# Patient Record
Sex: MALE | Race: WHITE | Employment: UNEMPLOYED | URBAN - METROPOLITAN AREA
[De-identification: names, ages, dates, MRNs, and addresses within clinical notes are randomized per-mention and may not be internally consistent; named-entity substitution may affect disease eponyms.]

---

## 2017-02-24 DIAGNOSIS — R46.89 AGGRESSIVE BEHAVIOR OF ADULT: ICD-10-CM

## 2017-02-24 DIAGNOSIS — R45.1 AGITATION: ICD-10-CM

## 2017-02-24 NOTE — TELEPHONE ENCOUNTER
Olanzapine 10mg  Last Written Prescription Date: 11/17/16  Last Fill Quantity: 90, # refills: 0  [place] [facility] [place] or [facility] prescribing provider: 8/26/16       BP Readings from Last 3 Encounters:   08/26/16 130/86   08/20/16 137/77   08/16/16 (!) 126/98     Pulse Readings from Last 2 Encounters:   08/26/16 104   08/20/16 89     Lab Results   Component Value Date     08/20/2016     Lab Results   Component Value Date    WBC 6.7 08/20/2016     Lab Results   Component Value Date    RBC 5.06 08/20/2016     Lab Results   Component Value Date    HGB 15.7 08/20/2016     Lab Results   Component Value Date    HCT 45.2 08/20/2016     No components found for: MCT  Lab Results   Component Value Date    MCV 89 08/20/2016     Lab Results   Component Value Date    MCH 31.0 08/20/2016     Lab Results   Component Value Date    MCHC 34.7 08/20/2016     Lab Results   Component Value Date    RDW 11.9 08/20/2016     Lab Results   Component Value Date     08/20/2016     Lab Results   Component Value Date    CHOL 190 08/16/2016     Lab Results   Component Value Date    HDL 41 08/16/2016     Lab Results   Component Value Date    LDL 94 08/16/2016     Lab Results   Component Value Date    TRIG 277 08/16/2016     Lab Results   Component Value Date    CHOLHDLRATIO 4.2 02/28/2015       Labs showing if normal/abnormal  Lab Results   Component Value Date     (H) 08/20/2016     Lab Results   Component Value Date    CHOL 190 08/16/2016    TRIG 277 (H) 08/16/2016    HDL 41 08/16/2016    LDL 94 08/16/2016    VLDL 33 (H) 02/28/2015    CHOLHDLRATIO 4.2 02/28/2015

## 2017-03-01 RX ORDER — OLANZAPINE 5 MG/1
TABLET ORAL
Qty: 60 TABLET | Refills: 5 | Status: SHIPPED | OUTPATIENT
Start: 2017-03-01 | End: 2017-07-26

## 2017-03-01 NOTE — TELEPHONE ENCOUNTER
Routing refill request to provider for review/approval because:  Labs out of range:  Trig, glucose  BP out of SO parameters  HR >100    Please advise, thanks.

## 2017-04-01 ENCOUNTER — HOSPITAL ENCOUNTER (EMERGENCY)
Facility: CLINIC | Age: 22
Discharge: HOME | End: 2017-04-01
Attending: EMERGENCY MEDICINE | Admitting: EMERGENCY MEDICINE
Payer: COMMERCIAL

## 2017-04-01 VITALS
HEART RATE: 79 BPM | TEMPERATURE: 97.8 F | SYSTOLIC BLOOD PRESSURE: 142 MMHG | OXYGEN SATURATION: 98 % | DIASTOLIC BLOOD PRESSURE: 90 MMHG | RESPIRATION RATE: 16 BRPM

## 2017-04-01 DIAGNOSIS — H60.501 ACUTE OTITIS EXTERNA OF RIGHT EAR, UNSPECIFIED TYPE: ICD-10-CM

## 2017-04-01 PROCEDURE — 99282 EMERGENCY DEPT VISIT SF MDM: CPT

## 2017-04-01 RX ORDER — CIPROFLOXACIN AND DEXAMETHASONE 3; 1 MG/ML; MG/ML
4 SUSPENSION/ DROPS AURICULAR (OTIC) 2 TIMES DAILY
Qty: 7.5 ML | Refills: 0 | Status: SHIPPED | OUTPATIENT
Start: 2017-04-01 | End: 2017-04-08

## 2017-04-01 ASSESSMENT — ENCOUNTER SYMPTOMS
WOUND: 1
RHINORRHEA: 0
COUGH: 0

## 2017-04-01 NOTE — ED PROVIDER NOTES
History     Chief Complaint:  Otalgia      HPI   Farooq Sorto is a 21 year old male with a history of ADHD, Anxiety, Chemical dependency, depression, and seizures who presents with right sided otalgia. The patient states that he developed right sided ear pain for the last 2-3 days. He did notice some bloody drainage from the right ear. Additionally, he is concerned about an insect bight to his right posterior calf. He states that it is healing but continues to be slightly red. The patient denies any cough, runny nose, sore throat, or other symptoms.     Allergies:  Penicillins-Rash     Medications:    Zyprexa  Zantac  Albuterol Inhaler  Clonazepam  Vimpat  Guanfacine  Lamictal   Citalopram Hydrobromide  Amantadine     Past Medical History:    ADHD  Anxiety   Chemical dependency   Depressive Disorder  Seizures    Past Surgical History:    Back Surgery   Head and neck surgery   Soft Tissue Surgery     Family History:    Diabetes-Father  Seizure Disorder-Mother, Brother    Social History:  Marital Status: Single  Presents to the ED with his father  Tobacco Use: Former Smoker, quit 2016  Alcohol Use: No  PCP: Daina Waldron      Review of Systems   HENT: Positive for ear discharge and ear pain. Negative for rhinorrhea.    Respiratory: Negative for cough.    Skin: Positive for wound.   All other systems reviewed and are negative.    Physical Exam   First Vitals:  BP: (!) 149/95  Pulse: 79  Temp: 97.8  F (36.6  C)  Resp: 16  SpO2: 97 %    Physical Exam  Constitutional: Patient is well appearing. No distress.  Head: Atraumatic.  Ears: TM on right intact. Dried blood in the canal, skin breakdown without pinna tenderness. No mastoid tenderness no adenopathy.   Mouth/Throat: Oropharynx is clear and moist. No oropharyngeal exudate.  Eyes: Conjunctivae and EOM are normal. No scleral icterus.  Neck: Normal range of motion. Neck supple.   Cardiovascular: Normal rate, regular rhythm, normal heart sounds and intact  distal pulses.   Pulmonary/Chest: Breath sounds normal. No respiratory distress.  Abdominal: Soft. Bowel sounds are normal. No distension. No tenderness. No rebound or guarding.   Musculoskeletal: Normal range of motion. No edema or tenderness.   Neurological: Alert and orientated to person, place, and time. No observable focal neuro deficit  Skin: Warm and dry. No rash noted. Not diaphoretic.      Emergency Department Course   Emergency Department Course:  Nursing notes and vitals reviewed.  I performed an exam of the patient as documented above.  Findings and plan explained to the patient. Patient discharged home with instructions regarding supportive care, medications, and reasons to return. The importance of close follow-up was reviewed. The patient was prescribed Ciprodex.      Impression & Plan    Medical Decision Making:  Not malignant otitis.  Appears cerumen with skin breakdown in the canal.  Drops empirically and follow up recommended.       All questions and concerns were answered. The patient was discharged home and recommended to follow up with his primary physician and return with any new or worsening symptoms.      Diagnosis:    ICD-10-CM    1. Acute otitis externa of right ear, unspecified type H60.501        Disposition:  discharged to home    Discharge Medications:  Discharge Medication List as of 4/1/2017  8:22 AM      START taking these medications    Details   ciprofloxacin-dexamethasone (CIPRODEX) otic suspension Place 4 drops into the right ear 2 times daily for 7 days, Disp-7.5 mL, R-0, Local Print               Henrietta ESCALANTE am serving as a scribe on 4/1/2017 at 8:06 AM to personally document services performed by  based on my observations and the provider's statements to me.    4/1/2017   [facility] EMERGENCY DEPARTMENT       Sung Palma MD  04/01/17 [id number]

## 2017-04-01 NOTE — DISCHARGE INSTRUCTIONS
External Ear Infection (Adult)    External otitis (also called  swimmer s ear ) is an infection in the ear canal. It is often caused by bacteria or fungus. It can occur a few days after water gets trapped in the ear canal (from swimming or bathing). It can also occur after cleaning too deeply in the ear canal with a cotton swab or other object. Sometimes, hair care products get into the ear canal and cause this problem.  Symptoms can include pain, fever, itching, redness, drainage, or swelling of the ear canal. Temporary hearing loss may also occur.  Home care    Do not try to clean the ear canal. This can push pus and bacteria deeper into the canal.    Use prescribed ear drops as directed. These help reduce swelling and fight the infection. If an ear wick was placed in the ear canal, apply drops right onto the end of the wick. The wick will draw the medication into the ear canal even if it is swollen closed.    A cotton ball may be loosely placed in the outer ear to absorb any drainage.    You may use acetaminophen or ibuprofen to control pain, unless another medication was prescribed. Note: If you have chronic liver or kidney disease or ever had a stomach ulcer or GI bleeding, talk to your health care provider before taking any of these medications.    Do not allow water to get into your ear when bathing. Also, avoid swimming until the infection has cleared.  Prevention    Keep your ears dry. This helps lower the risk of infection. Dry your ears with a towel or hair dryer after getting wet. Also, use ear plugs when swimming.    Do not stick any objects in the ear to remove wax.    If you feel water trapped in your ear, use ear drops right away. You can get these drops over the counter at most drugstores.  They work by removing water from the ear canal.  Follow-up care  Follow up with your health care provider in one week, or as advised.   When to seek medical advice  Call your health care provider right away if  any of these occur:    Ear pain becomes worse or doesn t improve after 3 days of treatment    Redness or swelling of the outer ear occurs or gets worse    Headache    Painful or stiff neck    Drowsiness or confusion    Fever of 100.4 F (38 C) or higher, or as directed by your health care provider    Seizure    5720-3912 [employer]. [address] [place] [address]. All rights reserved. This information is not intended as a substitute for professional medical care. Always follow your healthcare professional's instructions.

## 2017-04-01 NOTE — ED NOTES
Patient arrives here for evaluation or right ear pain that started a few days ago with associated symptoms of bleeding. Also concerned about insect bite of the right leg. Patient is AOX4, ABC's intact.

## 2017-04-01 NOTE — ED AVS SNAPSHOT
[facility] Emergency Department    [address] [facility] [address]    Phone:  [phone]    Fax:  [phone]                                       Farooq Sorto   MRN: [id number]    Department:  [facility] Emergency Department   Date of Visit:  4/1/2017           Patient Information     Date Of Birth          1995        Your diagnoses for this visit were:     Acute otitis externa of right ear, unspecified type        You were seen by Sung Palma MD.      Follow-up Information     Follow up with Daina Waldron MD. Schedule an appointment as soon as possible for a visit in 2 days.    Specialty:  Internal Medicine    Why:  As needed, If symptoms worsen    Contact information:    [facility]  [address] [facility] [address]  [phone]          Discharge Instructions         External Ear Infection (Adult)    External otitis (also called  swimmer s ear ) is an infection in the ear canal. It is often caused by bacteria or fungus. It can occur a few days after water gets trapped in the ear canal (from swimming or bathing). It can also occur after cleaning too deeply in the ear canal with a cotton swab or other object. Sometimes, hair care products get into the ear canal and cause this problem.  Symptoms can include pain, fever, itching, redness, drainage, or swelling of the ear canal. Temporary hearing loss may also occur.  Home care    Do not try to clean the ear canal. This can push pus and bacteria deeper into the canal.    Use prescribed ear drops as directed. These help reduce swelling and fight the infection. If an ear wick was placed in the ear canal, apply drops right onto the end of the wick. The wick will draw the medication into the ear canal even if it is swollen closed.    A cotton ball may be loosely placed in the outer ear to absorb any drainage.    You may use acetaminophen or ibuprofen to control pain, unless  another medication was prescribed. Note: If you have chronic liver or kidney disease or ever had a stomach ulcer or GI bleeding, talk to your health care provider before taking any of these medications.    Do not allow water to get into your ear when bathing. Also, avoid swimming until the infection has cleared.  Prevention    Keep your ears dry. This helps lower the risk of infection. Dry your ears with a towel or hair dryer after getting wet. Also, use ear plugs when swimming.    Do not stick any objects in the ear to remove wax.    If you feel water trapped in your ear, use ear drops right away. You can get these drops over the counter at most drugstores.  They work by removing water from the ear canal.  Follow-up care  Follow up with your health care provider in one week, or as advised.   When to seek medical advice  Call your health care provider right away if any of these occur:    Ear pain becomes worse or doesn t improve after 3 days of treatment    Redness or swelling of the outer ear occurs or gets worse    Headache    Painful or stiff neck    Drowsiness or confusion    Fever of 100.4 F (38 C) or higher, or as directed by your health care provider    Seizure    2177-0444 [employer]. [address] [place] [address]. All rights reserved. This information is not intended as a substitute for professional medical care. Always follow your healthcare professional's instructions.          24 Hour Appointment Hotline       To make an appointment at any [facility], call [phone] ([phone]). If you don't have a family doctor or clinic, we will help you find one. [facility] clinics are conveniently located to serve the needs of you and your family.             Review of your medicines      START taking        Dose / Directions Last dose taken    ciprofloxacin-dexamethasone otic suspension   Commonly known as:  CIPRODEX   Dose:  4 drop   Quantity:  7.5 mL        Place 4 drops  into the right ear 2 times daily for 7 days   Refills:  0          Our records show that you are taking the medicines listed below. If these are incorrect, please call your family doctor or clinic.        Dose / Directions Last dose taken    albuterol 108 (90 BASE) MCG/ACT Inhaler   Commonly known as:  PROAIR HFA/PROVENTIL HFA/VENTOLIN HFA   Dose:  2 puff   Quantity:  1 Inhaler        Inhale 2 puffs into the lungs every 6 hours as needed for shortness of breath / dyspnea or wheezing   Refills:  1        amantadine 100 MG capsule   Commonly known as:  SYMMETREL   Dose:  100 mg        Take 100 mg by mouth 2 times daily   Refills:  0        benzoyl peroxide 10 % topical gel        Apply topically daily   Refills:  0        CITALOPRAM HYDROBROMIDE PO   Dose:  20 mg        Take 20 mg by mouth daily   Refills:  0        CLONAZEPAM PO   Dose:  0.5 mg        Take 0.5 mg by mouth   Refills:  0        GUANFACINE HCL PO   Dose:  2 mg        Take 2 mg by mouth 2 times daily   Refills:  0        lamoTRIgine 200 MG tablet   Commonly known as:  LaMICtal   Dose:  500 mg        Take 500 mg by mouth 2 times daily   Refills:  0        * OLANZapine 10 MG tablet   Commonly known as:  zyPREXA   Dose:  10 mg   Quantity:  90 tablet        Take 1 tablet (10 mg) by mouth At Bedtime   Refills:  0        * OLANZapine 5 MG tablet   Commonly known as:  zyPREXA   Quantity:  60 tablet        Take 1 tablet by mouth twice daily as needed for agitation.   Refills:  5        ranitidine 150 MG tablet   Commonly known as:  ZANTAC   Dose:  150 mg   Quantity:  60 tablet        Take 1 tablet (150 mg) by mouth 2 times daily   Refills:  9        VIMPAT PO   Dose:  100 mg        Take 100 mg by mouth   Refills:  0        * Notice:  This list has 2 medication(s) that are the same as other medications prescribed for you. Read the directions carefully, and ask your doctor or other care provider to review them with you.            Prescriptions were sent or  printed at these locations (1 Prescription)                   Other Prescriptions                Printed at Department/Unit printer (1 of 1)         ciprofloxacin-dexamethasone (CIPRODEX) otic suspension                Orders Needing Specimen Collection     None      Pending Results     No orders found from 3/30/2017 to 4/2/2017.            Pending Culture Results     No orders found from 3/30/2017 to 4/2/2017.             Test Results from your hospital stay            Clinical Quality Measure: Blood Pressure Screening     Your blood pressure was checked while you were in the emergency department today. The last reading we obtained was  BP: (!) 149/95 . Please read the guidelines below about what these numbers mean and what you should do about them.  If your systolic blood pressure (the top number) is less than 120 and your diastolic blood pressure (the bottom number) is less than 80, then your blood pressure is normal. There is nothing more that you need to do about it.  If your systolic blood pressure (the top number) is 120-139 or your diastolic blood pressure (the bottom number) is 80-89, your blood pressure may be higher than it should be. You should have your blood pressure rechecked within a year by a primary care provider.  If your systolic blood pressure (the top number) is 140 or greater or your diastolic blood pressure (the bottom number) is 90 or greater, you may have high blood pressure. High blood pressure is treatable, but if left untreated over time it can put you at risk for heart attack, stroke, or kidney failure. You should have your blood pressure rechecked by a primary care provider within the next 4 weeks.  If your provider in the emergency department today gave you specific instructions to follow-up with your doctor or provider even sooner than that, you should follow that instruction and not wait for up to 4 weeks for your follow-up visit.        Thank you for choosing [facility]       Thank  "you for choosing [facility] for your care. Our goal is always to provide you with excellent care. Hearing back from our patients is one way we can continue to improve our services. Please take a few minutes to complete the written survey that you may receive in the mail after you visit with us. Thank you!        [employer] Information     [facility] lets you send messages to your doctor, view your test results, renew your prescriptions, schedule appointments and more. To sign up, go to [email or web address] . Click on \"Log in\" on the left side of the screen, which will take you to the Welcome page. Then click on \"Sign up Now\" on the right side of the page.     You will be asked to enter the access code listed below, as well as some personal information. Please follow the directions to create your username and password.     Your access code is: [email or web address]  Expires: 2017  8:22 AM     Your access code will  in 90 days. If you need help or a new code, please call your [facility] clinic or [phone].        Care EveryWhere ID     This is your Care EveryWhere ID. This could be used by other organizations to access your [facility] medical records  [id number]        After Visit Summary       This is your record. Keep this with you and show to your community pharmacist(s) and doctor(s) at your next visit.                  "

## 2017-04-01 NOTE — ED AVS SNAPSHOT
[facility] Emergency Department    [address]    [facility] [address]    Phone:  [phone]    Fax:  [phone]                                       Farooq Sorto   MRN: [id number]    Department:  [facility] Emergency Department   Date of Visit:  4/1/2017           After Visit Summary Signature Page     I have received my discharge instructions, and my questions have been answered. I have discussed any challenges I see with this plan with the nurse or doctor.    ..........................................................................................................................................  Patient/Patient Representative Signature      ..........................................................................................................................................  Patient Representative Print Name and Relationship to Patient    ..................................................               ................................................  Date                                            Time    ..........................................................................................................................................  Reviewed by Signature/Title    ...................................................              ..............................................  Date                                                            Time

## 2017-04-10 ENCOUNTER — TRANSFERRED RECORDS (OUTPATIENT)
Dept: HEALTH INFORMATION MANAGEMENT | Facility: CLINIC | Age: 22
End: 2017-04-10

## 2017-05-04 DIAGNOSIS — L70.0 ACNE VULGARIS: Primary | ICD-10-CM

## 2017-05-04 NOTE — TELEPHONE ENCOUNTER
Ward, pt's caregiver calls to request refill for Benzoyl Peroxide 10%. He states pt is using this BID for acne.     Benzoyl Peroxide 10%      Last Written Prescription Date: historic  Last Fill Quantity: n/a,  # refills: n/a   Last Office Visit with [facility], [facility] or [facility] prescribing provider: 8/26/16                                             Routing refill request to provider for review/approval because:  Medication is reported/historical

## 2017-05-05 RX ORDER — BENZOYL PEROXIDE 10 G/100G
GEL TOPICAL 2 TIMES DAILY
Qty: 28 G | Refills: 3 | Status: SHIPPED | OUTPATIENT
Start: 2017-05-05

## 2017-05-10 ENCOUNTER — TELEPHONE (OUTPATIENT)
Dept: INTERNAL MEDICINE | Facility: CLINIC | Age: 22
End: 2017-05-10
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
## 2017-05-11 ENCOUNTER — OFFICE VISIT (OUTPATIENT)
Dept: INTERNAL MEDICINE | Facility: CLINIC | Age: 22
End: 2017-05-11
Payer: COMMERCIAL

## 2017-05-11 VITALS
DIASTOLIC BLOOD PRESSURE: 104 MMHG | BODY MASS INDEX: 38.89 KG/M2 | HEIGHT: 71 IN | OXYGEN SATURATION: 93 % | TEMPERATURE: 97.6 F | SYSTOLIC BLOOD PRESSURE: 144 MMHG | WEIGHT: 277.8 LBS | HEART RATE: 104 BPM

## 2017-05-11 DIAGNOSIS — M25.511 ACUTE PAIN OF RIGHT SHOULDER: Primary | ICD-10-CM

## 2017-05-11 PROCEDURE — 99213 OFFICE O/P EST LOW 20 MIN: CPT | Performed by: FAMILY MEDICINE
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
## 2017-05-11 NOTE — MR AVS SNAPSHOT
After Visit Summary   5/11/2017    Farooq Sorto    MRN: [id number]           Patient Information     Date Of Birth          1995        Visit Information        Provider Department      5/11/2017 11:40 AM Navid Winkler MD [facility]        Today's Diagnoses     Acute pain of right shoulder    -  1       Follow-ups after your visit        Additional Services     PHYSICAL THERAPY REFERRAL       *This therapy referral will be filtered to a centralized scheduling office at [facility] and the patient will receive a call to schedule an appointment at a [place] location most convenient for them. *     [facility] provides Physical Therapy evaluation and treatment and many specialty services across the [place] system.  If requesting a specialty program, please choose from the list below.    If you have not heard from the scheduling office within 2 business days, please call [phone] for all locations, with the exception of [place], please call [phone].  Treatment: Evaluation & Treatment    Please be aware that coverage of these services is subject to the terms and limitations of your health insurance plan.  Call member services at your health plan with any benefit or coverage questions.      **Note to Provider:  If you are referring outside of [place] for the therapy appointment, please list the name of the location in the  special instructions  above, print the referral and give to the patient to schedule the appointment.                  Who to contact     If you have questions or need follow up information about today's clinic visit or your schedule please contact [facility] directly at [phone].  Normal or non-critical lab and imaging results will be communicated to you by MyChart, letter or phone within 4 business days after the clinic has received the results. If you do not hear from us within 7  "days, please contact the clinic through [employer] or phone. If you have a critical or abnormal lab result, we will notify you by phone as soon as possible.  Submit refill requests through [employer] or call your pharmacy and they will forward the refill request to us. Please allow 3 business days for your refill to be completed.          Additional Information About Your Visit        [employer] Information     [employer] lets you send messages to your doctor, view your test results, renew your prescriptions, schedule appointments and more. To sign up, go to [email or web address] . Click on \"Log in\" on the left side of the screen, which will take you to the Welcome page. Then click on \"Sign up Now\" on the right side of the page.     You will be asked to enter the access code listed below, as well as some personal information. Please follow the directions to create your username and password.     Your access code is: [email or web address]  Expires: 2017  8:22 AM     Your access code will  in 90 days. If you need help or a new code, please call your [place] clinic or [phone].        Care EveryWhere ID     This is your Care EveryWhere ID. This could be used by other organizations to access your [place] medical records  [id number]        Your Vitals Were     Pulse Temperature Height Pulse Oximetry BMI (Body Mass Index)       104 97.6  F (36.4  C) (Oral) 5' 10.5\" (1.791 m) 93% 39.3 kg/m2        Blood Pressure from Last 3 Encounters:   17 (!) 144/104   17 142/90   16 130/86    Weight from Last 3 Encounters:   17 277 lb 12.8 oz (126 kg)   16 283 lb 12.8 oz (128.7 kg)   16 278 lb 6.4 oz (126.3 kg)              We Performed the Following     PHYSICAL THERAPY REFERRAL        Primary Care Provider Office Phone # Fax #    Daina Waldron [phone]       [facility] [address] [place] [address]        Thank you!     Thank you for choosing [facility]  for your care. Our goal is always to provide you with excellent care. Hearing back from our patients is one way we can continue to improve our services. Please take a few minutes to complete the written survey that you may receive in the mail after your visit with us. Thank you!             Your Updated Medication List - Protect others around you: Learn how to safely use, store and throw away your medicines at www.disposemymeds.org.          This list is accurate as of: 5/11/17 12:09 PM.  Always use your most recent med list.                   Brand Name Dispense Instructions for use    albuterol 108 (90 BASE) MCG/ACT Inhaler    PROAIR HFA/PROVENTIL HFA/VENTOLIN HFA    1 Inhaler    Inhale 2 puffs into the lungs every 6 hours as needed for shortness of breath / dyspnea or wheezing       amantadine 100 MG capsule    SYMMETREL     Take 100 mg by mouth 2 times daily       benzoyl peroxide 10 % topical gel     28 g    Apply topically 2 times daily       CITALOPRAM HYDROBROMIDE PO      Take 20 mg by mouth daily       CLONAZEPAM PO      Take 0.5 mg by mouth       GUANFACINE HCL PO      Take 2 mg by mouth 2 times daily       lamoTRIgine 200 MG tablet    LaMICtal     Take 500 mg by mouth 2 times daily       * OLANZapine 10 MG tablet    zyPREXA    90 tablet    Take 1 tablet (10 mg) by mouth At Bedtime       * OLANZapine 5 MG tablet    zyPREXA    60 tablet    Take 1 tablet by mouth twice daily as needed for agitation.       ranitidine 150 MG tablet    ZANTAC    60 tablet    Take 1 tablet (150 mg) by mouth 2 times daily       VIMPAT PO      Take 100 mg by mouth       * Notice:  This list has 2 medication(s) that are the same as other medications prescribed for you. Read the directions carefully, and ask your doctor or other care provider to review them with you.
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
## 2017-07-26 ENCOUNTER — TELEPHONE (OUTPATIENT)
Dept: INTERNAL MEDICINE | Facility: CLINIC | Age: 22
End: 2017-07-26

## 2017-07-26 ENCOUNTER — OFFICE VISIT (OUTPATIENT)
Dept: INTERNAL MEDICINE | Facility: CLINIC | Age: 22
End: 2017-07-26
Payer: COMMERCIAL

## 2017-07-26 VITALS
SYSTOLIC BLOOD PRESSURE: 130 MMHG | WEIGHT: 247.7 LBS | HEIGHT: 71 IN | HEART RATE: 121 BPM | OXYGEN SATURATION: 95 % | TEMPERATURE: 98.1 F | BODY MASS INDEX: 34.68 KG/M2 | DIASTOLIC BLOOD PRESSURE: 82 MMHG

## 2017-07-26 DIAGNOSIS — L08.9 SKIN PUSTULE: ICD-10-CM

## 2017-07-26 DIAGNOSIS — F29 PSYCHOSIS, UNSPECIFIED PSYCHOSIS TYPE (H): ICD-10-CM

## 2017-07-26 DIAGNOSIS — L08.9 SKIN PUSTULE: Primary | ICD-10-CM

## 2017-07-26 PROCEDURE — 10060 I&D ABSCESS SIMPLE/SINGLE: CPT | Performed by: FAMILY MEDICINE

## 2017-07-26 PROCEDURE — 87077 CULTURE AEROBIC IDENTIFY: CPT | Performed by: FAMILY MEDICINE

## 2017-07-26 PROCEDURE — 99213 OFFICE O/P EST LOW 20 MIN: CPT | Mod: 25 | Performed by: FAMILY MEDICINE

## 2017-07-26 PROCEDURE — 87186 SC STD MICRODIL/AGAR DIL: CPT | Performed by: FAMILY MEDICINE

## 2017-07-26 PROCEDURE — 87070 CULTURE OTHR SPECIMN AEROBIC: CPT | Performed by: FAMILY MEDICINE

## 2017-07-26 RX ORDER — CLINDAMYCIN HCL 300 MG
300 CAPSULE ORAL 4 TIMES DAILY
Qty: 40 CAPSULE | Refills: 0 | Status: SHIPPED | OUTPATIENT
Start: 2017-07-26 | End: 2017-07-26

## 2017-07-26 RX ORDER — CLINDAMYCIN HCL 300 MG
300 CAPSULE ORAL 4 TIMES DAILY
Qty: 40 CAPSULE | Refills: 0 | Status: SHIPPED | OUTPATIENT
Start: 2017-07-26 | End: 2017-12-19

## 2017-07-26 NOTE — MR AVS SNAPSHOT
"              After Visit Summary   7/26/2017    Farooq Sorto    MRN: [id number]           Patient Information     Date Of Birth          1995        Visit Information        Provider Department      7/26/2017 1:00 PM Дмитрий Olson MD [facility]        Today's Diagnoses     Skin pustule    -  1    Psychosis, unspecified psychosis type          Care Instructions      Take prescribed medication as directed.    Clean carefully and cover involved skin areas twice daily.    To E R or return to clinic if worsening or not improving in next 2-3 days.          Follow-ups after your visit        Who to contact     If you have questions or need follow up information about today's clinic visit or your schedule please contact [facility] directly at [phone].  Normal or non-critical lab and imaging results will be communicated to you by "[employer]hart, letter or phone within 4 business days after the clinic has received the results. If you do not hear from us within 7 days, please contact the clinic through "[employer]hart or phone. If you have a critical or abnormal lab result, we will notify you by phone as soon as possible.  Submit refill requests through [employer] or call your pharmacy and they will forward the refill request to us. Please allow 3 business days for your refill to be completed.          Additional Information About Your Visit        [employer] Information     [employer] lets you send messages to your doctor, view your test results, renew your prescriptions, schedule appointments and more. To sign up, go to [email or web address] . Click on \"Log in\" on the left side of the screen, which will take you to the Welcome page. Then click on \"Sign up Now\" on the right side of the page.     You will be asked to enter the access code listed below, as well as some personal information. Please follow the directions to create your username and password.     Your access code is: " [email or web address]  Expires: 10/24/2017  1:36 PM     Your access code will  in 90 days. If you need help or a new code, please call your [facility] or [phone].        Care EveryWhere ID     This is your Care EveryWhere ID. This could be used by other organizations to access your [place] medical records  [id number]        Your Vitals Were     Pulse Temperature Height Pulse Oximetry BMI (Body Mass Index)       121 98.1  F (36.7  C) (Oral) 5' 10.5\" (1.791 m) 95% 35.04 kg/m2        Blood Pressure from Last 3 Encounters:   17 130/82   17 (!) 144/104   17 142/90    Weight from Last 3 Encounters:   17 247 lb 11.2 oz (112.4 kg)   17 277 lb 12.8 oz (126 kg)   16 283 lb 12.8 oz (128.7 kg)              Today, you had the following     No orders found for display         Today's Medication Changes          These changes are accurate as of: 17  1:36 PM.  If you have any questions, ask your nurse or doctor.               Start taking these medicines.        Dose/Directions    clindamycin 300 MG capsule   Commonly known as:  CLEOCIN   Used for:  Skin pustule   Started by:  Дмитрий Olson MD        Dose:  300 mg   Take 1 capsule (300 mg) by mouth 4 times daily   Quantity:  40 capsule   Refills:  0            Where to get your medicines      These medications [facility] to [facility] [address] [place] [address] [address] [address] [facility]   [address] [place] [address]     Phone:  [phone]     clindamycin 300 MG capsule                Primary Care Provider Office Phone # Fax #    Daina Waldron [phone]       [facility] [address] [place] [address]        Equal Access to Services     KOLTON LOGAN AH: Hadii aad ku hadasho Soomaali, waaxda luqadaha, qaybta kaalmazachariah zaragoza, janny paige. [facility] [phone].    ATENCIÓN: Si habla español, tiene a eagle disposición servicios " elian de asistencia lingüística. Michael vazquez [phone].    We comply with applicable federal civil rights laws and [place] laws. We do not discriminate on the basis of race, color, national origin, age, disability sex, sexual orientation or gender identity.            Thank you!     Thank you for choosing [facility]  for your care. Our goal is always to provide you with excellent care. Hearing back from our patients is one way we can continue to improve our services. Please take a few minutes to complete the written survey that you may receive in the mail after your visit with us. Thank you!             Your Updated Medication List - Protect others around you: Learn how to safely use, store and throw away your medicines at www.disposemymeds.org.          This list is accurate as of: 7/26/17  1:36 PM.  Always use your most recent med list.                   Brand Name Dispense Instructions for use Diagnosis    albuterol 108 (90 BASE) MCG/ACT Inhaler    PROAIR HFA/PROVENTIL HFA/VENTOLIN HFA    1 Inhaler    Inhale 2 puffs into the lungs every 6 hours as needed for shortness of breath / dyspnea or wheezing    SOB (shortness of breath)       amantadine 100 MG capsule    SYMMETREL     Take 100 mg by mouth 2 times daily        benzoyl peroxide 10 % topical gel     28 g    Apply topically 2 times daily    Acne vulgaris       clindamycin 300 MG capsule    CLEOCIN    40 capsule    Take 1 capsule (300 mg) by mouth 4 times daily    Skin pustule       CLONAZEPAM PO      Take 0.5 mg by mouth        GUANFACINE HCL PO      Take 2 mg by mouth 2 times daily        lamoTRIgine 200 MG tablet    LaMICtal     Take 500 mg by mouth 2 times daily        OLANZapine 10 MG tablet    zyPREXA    90 tablet    Take 1 tablet (10 mg) by mouth At Bedtime    Aggressive behavior, Agitation       ranitidine 150 MG tablet    ZANTAC    60 tablet    Take 1 tablet (150 mg) by mouth 2 times daily    Gastroesophageal reflux disease  without esophagitis       VIMPAT PO      Take 100 mg by mouth

## 2017-07-26 NOTE — NURSING NOTE
"Chief Complaint   Patient presents with     Derm Problem     Bumps on right leg for 1 week.       Initial /82 (BP Location: Right arm, Patient Position: Sitting, Cuff Size: Adult Large)  Pulse 121  Temp 98.1  F (36.7  C) (Oral)  Ht 5' 10.5\" (1.791 m)  Wt 247 lb 11.2 oz (112.4 kg)  SpO2 95%  BMI 35.04 kg/m2 Estimated body mass index is 35.04 kg/(m^2) as calculated from the following:    Height as of this encounter: 5' 10.5\" (1.791 m).    Weight as of this encounter: 247 lb 11.2 oz (112.4 kg).  Medication Reconciliation: complete   Soni Rios MA      "

## 2017-07-26 NOTE — PATIENT INSTRUCTIONS
Take prescribed medication as directed.    Clean carefully and cover involved skin areas twice daily.    To E R or return to clinic if worsening or not improving in next 2-3 days.

## 2017-07-26 NOTE — TELEPHONE ENCOUNTER
Keyla from [facility] pharmacy calling stating patient is restricted, ABX sent over today by Wesley ALEJANDRA is not valid, Dr. Waldron is ok to send if able. Verbal or written accepted by provider.   Medication pended for provider.  Provider please review and advise. Thank you.

## 2017-07-27 NOTE — PROGRESS NOTES
"CHIEF COMPLAINT    Check painful bump on skin      HISTORY    He is from a group home. Here with staff [facility].    He has developed a painful, raised bump on L leg.    Patient Active Problem List   Diagnosis     Aggressive behavior     Substance abuse     Suicidal ideation     Mild major depression (H)     Generalized anxiety disorder     Psychosis     Epilepsy (H)     Non morbid obesity due to excess calories       REVIEW OF SYSTEMS    No fever  No swelling      Past Medical History:   Diagnosis Date     ADHD (attention deficit hyperactivity disorder)      Anxiety      Chemical dependency (H)      Depressive disorder      Seizures (H)        EXAM  /82 (BP Location: Right arm, Patient Position: Sitting, Cuff Size: Adult Large)  Pulse 121  Temp 98.1  F (36.7  C) (Oral)  Ht 5' 10.5\" (1.791 m)  Wt 247 lb 11.2 oz (112.4 kg)  SpO2 95%  BMI 35.04 kg/m2    Large young man, NAD  Legs:  Lat L leg - 2.5 cm raised pustule  Several small 3-5 mm pustules scattered on legs    Procedure: I and D pustule L leg  Anes - none  Prep - none  I and D with 15 blade and forceps.  Purulent drainage obtained, cultured.  Guaze dressing applied.      (L08.9) Skin pustule  (primary encounter diagnosis)  Comment:   Plan: Wound Culture Aerobic Bacterial, DRAIN SKIN         ABSCESS SIMPLE/SINGLE, DISCONTINUED:         clindamycin (CLEOCIN) 300 MG capsule            (F29) Psychosis, unspecified psychosis type  Comment:   Plan:         Take prescribed medication as directed.    Clean carefully and cover involved skin areas twice daily.    To E R or return to clinic if worsening or not improving in next 2-3 days.    "

## 2017-07-28 LAB
BACTERIA SPEC CULT: ABNORMAL
MICRO REPORT STATUS: ABNORMAL
MICROORGANISM SPEC CULT: ABNORMAL
SPECIMEN SOURCE: ABNORMAL

## 2017-08-03 DIAGNOSIS — K21.9 GASTROESOPHAGEAL REFLUX DISEASE WITHOUT ESOPHAGITIS: ICD-10-CM

## 2017-08-03 NOTE — TELEPHONE ENCOUNTER
Ranitidine      Last Written Prescription Date: 10/14/16  Last Fill Quantity: 60,  # refills: 9   Last Office Visit with [facility], [facility] or [facility] prescribing provider: 07/26/17 Wesley

## 2017-08-21 ENCOUNTER — TELEPHONE (OUTPATIENT)
Dept: INTERNAL MEDICINE | Facility: CLINIC | Age: 22
End: 2017-08-21

## 2017-08-21 NOTE — TELEPHONE ENCOUNTER
Caregiver from group home calls. Pt came out of the bathroom confused and staggering about 5-10 minutes ago. He sat pt down and now back to baseline. He believes pt had a seizure, but it was different than typical seizures for pt. Pt did take seizure medications this morning. He has an appt for physical today at 11:40 and not sure if he should bring pt in or not. Advised either option would be fine. He prefers to reschedule. Appt scheduled for 8/30/17 with Dr. Waldron.

## 2017-08-30 ENCOUNTER — OFFICE VISIT (OUTPATIENT)
Dept: INTERNAL MEDICINE | Facility: CLINIC | Age: 22
End: 2017-08-30
Payer: COMMERCIAL

## 2017-08-30 VITALS
HEIGHT: 71 IN | TEMPERATURE: 98.4 F | BODY MASS INDEX: 38.36 KG/M2 | OXYGEN SATURATION: 96 % | HEART RATE: 93 BPM | DIASTOLIC BLOOD PRESSURE: 70 MMHG | WEIGHT: 274 LBS | SYSTOLIC BLOOD PRESSURE: 130 MMHG

## 2017-08-30 DIAGNOSIS — L21.8 OTHER SEBORRHEIC DERMATITIS: ICD-10-CM

## 2017-08-30 DIAGNOSIS — Z23 NEED FOR HPV VACCINATION: ICD-10-CM

## 2017-08-30 DIAGNOSIS — Z86.69 H/O TONIC-CLONIC SEIZURES: ICD-10-CM

## 2017-08-30 DIAGNOSIS — Z00.00 ENCOUNTER FOR ROUTINE ADULT HEALTH EXAMINATION WITHOUT ABNORMAL FINDINGS: Primary | ICD-10-CM

## 2017-08-30 DIAGNOSIS — R06.83 SNORING: ICD-10-CM

## 2017-08-30 LAB
BASOPHILS # BLD AUTO: 0 10E9/L (ref 0–0.2)
BASOPHILS NFR BLD AUTO: 0.7 %
DIFFERENTIAL METHOD BLD: NORMAL
EOSINOPHIL # BLD AUTO: 0.1 10E9/L (ref 0–0.7)
EOSINOPHIL NFR BLD AUTO: 1.3 %
ERYTHROCYTE [DISTWIDTH] IN BLOOD BY AUTOMATED COUNT: 12.9 % (ref 10–15)
HCT VFR BLD AUTO: 48.9 % (ref 40–53)
HGB BLD-MCNC: 16.1 G/DL (ref 13.3–17.7)
LYMPHOCYTES # BLD AUTO: 1.8 10E9/L (ref 0.8–5.3)
LYMPHOCYTES NFR BLD AUTO: 30.6 %
MCH RBC QN AUTO: 29.9 PG (ref 26.5–33)
MCHC RBC AUTO-ENTMCNC: 32.9 G/DL (ref 31.5–36.5)
MCV RBC AUTO: 91 FL (ref 78–100)
MONOCYTES # BLD AUTO: 0.7 10E9/L (ref 0–1.3)
MONOCYTES NFR BLD AUTO: 11.1 %
NEUTROPHILS # BLD AUTO: 3.4 10E9/L (ref 1.6–8.3)
NEUTROPHILS NFR BLD AUTO: 56.3 %
PLATELET # BLD AUTO: 295 10E9/L (ref 150–450)
RBC # BLD AUTO: 5.38 10E12/L (ref 4.4–5.9)
WBC # BLD AUTO: 6 10E9/L (ref 4–11)

## 2017-08-30 PROCEDURE — 80050 GENERAL HEALTH PANEL: CPT | Performed by: INTERNAL MEDICINE

## 2017-08-30 PROCEDURE — 99395 PREV VISIT EST AGE 18-39: CPT | Performed by: INTERNAL MEDICINE

## 2017-08-30 PROCEDURE — 36415 COLL VENOUS BLD VENIPUNCTURE: CPT | Performed by: INTERNAL MEDICINE

## 2017-08-30 PROCEDURE — 99000 SPECIMEN HANDLING OFFICE-LAB: CPT | Performed by: INTERNAL MEDICINE

## 2017-08-30 PROCEDURE — 87491 CHLMYD TRACH DNA AMP PROBE: CPT | Performed by: INTERNAL MEDICINE

## 2017-08-30 PROCEDURE — 80061 LIPID PANEL: CPT | Performed by: INTERNAL MEDICINE

## 2017-08-30 PROCEDURE — 87591 N.GONORRHOEAE DNA AMP PROB: CPT | Performed by: INTERNAL MEDICINE

## 2017-08-30 PROCEDURE — 80299 QUANTITATIVE ASSAY DRUG: CPT | Mod: 90 | Performed by: INTERNAL MEDICINE

## 2017-08-30 RX ORDER — KETOCONAZOLE 20 MG/ML
SHAMPOO TOPICAL
Qty: 120 ML | Refills: 1 | Status: SHIPPED | OUTPATIENT
Start: 2017-08-30 | End: 2017-12-19

## 2017-08-30 ASSESSMENT — ANXIETY QUESTIONNAIRES
3. WORRYING TOO MUCH ABOUT DIFFERENT THINGS: NEARLY EVERY DAY
IF YOU CHECKED OFF ANY PROBLEMS ON THIS QUESTIONNAIRE, HOW DIFFICULT HAVE THESE PROBLEMS MADE IT FOR YOU TO DO YOUR WORK, TAKE CARE OF THINGS AT HOME, OR GET ALONG WITH OTHER PEOPLE: SOMEWHAT DIFFICULT
5. BEING SO RESTLESS THAT IT IS HARD TO SIT STILL: MORE THAN HALF THE DAYS
1. FEELING NERVOUS, ANXIOUS, OR ON EDGE: SEVERAL DAYS
7. FEELING AFRAID AS IF SOMETHING AWFUL MIGHT HAPPEN: MORE THAN HALF THE DAYS
2. NOT BEING ABLE TO STOP OR CONTROL WORRYING: SEVERAL DAYS
6. BECOMING EASILY ANNOYED OR IRRITABLE: SEVERAL DAYS
GAD7 TOTAL SCORE: 11

## 2017-08-30 ASSESSMENT — PATIENT HEALTH QUESTIONNAIRE - PHQ9
5. POOR APPETITE OR OVEREATING: SEVERAL DAYS
SUM OF ALL RESPONSES TO PHQ QUESTIONS 1-9: 8

## 2017-08-30 NOTE — MR AVS SNAPSHOT
After Visit Summary   8/30/2017    Farooq Sorto    MRN: [id number]           Patient Information     Date Of Birth          1995        Visit Information        Provider Department      8/30/2017 10:40 AM Daina Waldron MD [facility]        Today's Diagnoses     Encounter for routine adult health examination without abnormal findings    -  1    Need for HPV vaccination        Snoring           Follow-ups after your visit        Additional Services     SLEEP EVALUATION & MANAGEMENT REFERRAL - ADULT       Please be aware that coverage of these services is subject to the terms and limitations of your health insurance plan.  Call member services at your health plan with any benefit or coverage questions.      Please bring the following to your appointment:    >>   List of current medications   >>   This referral request   >>   Any documents/labs given to you for this referral    [facility] [phone] (Age 18 and up)                  Future tests that were ordered for you today     Open Future Orders        Priority Expected Expires Ordered    SLEEP EVALUATION & MANAGEMENT REFERRAL - ADULT Routine  8/30/2018 8/30/2017            Who to contact     If you have questions or need follow up information about today's clinic visit or your schedule please contact [facility] directly at [phone].  Normal or non-critical lab and imaging results will be communicated to you by MyChart, letter or phone within 4 business days after the clinic has received the results. If you do not hear from us within 7 days, please contact the clinic through MyChart or phone. If you have a critical or abnormal lab result, we will notify you by phone as soon as possible.  Submit refill requests through [employer] or call your pharmacy and they will forward the refill request to us. Please allow 3 business days for your refill to be completed.           "Additional Information About Your Visit        MyChart Information     [employer]Ark lets you send messages to your doctor, view your test results, renew your prescriptions, schedule appointments and more. To sign up, go to [email or web address] . Click on \"Log in\" on the left side of the screen, which will take you to the Welcome page. Then click on \"Sign up Now\" on the right side of the page.     You will be asked to enter the access code listed below, as well as some personal information. Please follow the directions to create your username and password.     Your access code is: [email or web address]  Expires: 10/24/2017  1:36 PM     Your access code will  in 90 days. If you need help or a new code, please call your [facility] clinic or [phone].        Care EveryWhere ID     This is your Care EveryWhere ID. This could be used by other organizations to access your [facility] medical records  [id number]        Your Vitals Were     Pulse Temperature Height Pulse Oximetry BMI (Body Mass Index)       93 98.4  F (36.9  C) (Oral) 5' 10.5\" (1.791 m) 96% 38.76 kg/m2        Blood Pressure from Last 3 Encounters:   17 130/70   17 130/82   17 (!) 144/104    Weight from Last 3 Encounters:   17 274 lb (124.3 kg)   17 247 lb 11.2 oz (112.4 kg)   17 277 lb 12.8 oz (126 kg)              We Performed the Following     CBC with platelets differential     CHLAMYDIA TRACHOMATIS PCR     Comprehensive metabolic panel     Lipid panel reflex to direct LDL     NEISSERIA GONORRHOEA PCR     TSH with free T4 reflex        Primary Care Provider Office Phone # Fax #    Daina Waldron [phone]       [address] [facility] [address] [facility] [address]        Equal Access to Services     KOLOTN LOGAN : Chuy Mccarthy, mriela alaniz, sunita kajanny mirza. [facility] [phone].    ATENCIÓN: Si becka odom, tiene a eagle disposición " servicios gratuitos de asistencia lingüística. Michael vazquez [phone].    We comply with applicable federal civil rights laws and [place] laws. We do not discriminate on the basis of race, color, national origin, age, disability sex, sexual orientation or gender identity.            Thank you!     Thank you for choosing [facility]  for your care. Our goal is always to provide you with excellent care. Hearing back from our patients is one way we can continue to improve our services. Please take a few minutes to complete the written survey that you may receive in the mail after your visit with us. Thank you!             Your Updated Medication List - Protect others around you: Learn how to safely use, store and throw away your medicines at www.disposemymeds.org.          This list is accurate as of: 8/30/17 11:47 AM.  Always use your most recent med list.                   Brand Name Dispense Instructions for use Diagnosis    albuterol 108 (90 BASE) MCG/ACT Inhaler    PROAIR HFA/PROVENTIL HFA/VENTOLIN HFA    1 Inhaler    Inhale 2 puffs into the lungs every 6 hours as needed for shortness of breath / dyspnea or wheezing    SOB (shortness of breath)       amantadine 100 MG capsule    SYMMETREL     Take 100 mg by mouth 2 times daily        benzoyl peroxide 10 % topical gel     28 g    Apply topically 2 times daily    Acne vulgaris       clindamycin 300 MG capsule    CLEOCIN    40 capsule    Take 1 capsule (300 mg) by mouth 4 times daily    Skin pustule       CLONAZEPAM PO      Take 0.5 mg by mouth        GUANFACINE HCL PO      Take 2 mg by mouth 2 times daily        lamoTRIgine 200 MG tablet    LaMICtal     Take 500 mg by mouth 2 times daily        OLANZapine 10 MG tablet    zyPREXA    90 tablet    Take 1 tablet (10 mg) by mouth At Bedtime    Aggressive behavior, Agitation       ranitidine 150 MG tablet    ZANTAC    60 tablet    Take 1 tablet (150 mg) by mouth 2 times daily    Gastroesophageal reflux  disease without esophagitis       VIMPAT PO      Take 100 mg by mouth

## 2017-08-30 NOTE — NURSING NOTE
"Chief Complaint   Patient presents with     Physical     fasting , 3rd HPV shot       Initial /70 (BP Location: Right arm, Cuff Size: Adult Large)  Pulse 93  Temp 98.4  F (36.9  C) (Oral)  Ht 5' 10.5\" (1.791 m)  Wt 274 lb (124.3 kg)  SpO2 96%  BMI 38.76 kg/m2 Estimated body mass index is 38.76 kg/(m^2) as calculated from the following:    Height as of this encounter: 5' 10.5\" (1.791 m).    Weight as of this encounter: 274 lb (124.3 kg).  Medication Reconciliation: complete   Benita Mckinney CMA      "

## 2017-08-31 LAB
ALBUMIN SERPL-MCNC: 4 G/DL (ref 3.4–5)
ALP SERPL-CCNC: 194 U/L (ref 40–150)
ALT SERPL W P-5'-P-CCNC: 47 U/L (ref 0–70)
ANION GAP SERPL CALCULATED.3IONS-SCNC: 8 MMOL/L (ref 3–14)
AST SERPL W P-5'-P-CCNC: 26 U/L (ref 0–45)
BILIRUB SERPL-MCNC: 0.5 MG/DL (ref 0.2–1.3)
BUN SERPL-MCNC: 7 MG/DL (ref 7–30)
C TRACH DNA SPEC QL NAA+PROBE: NEGATIVE
CALCIUM SERPL-MCNC: 9.9 MG/DL (ref 8.5–10.1)
CHLORIDE SERPL-SCNC: 104 MMOL/L (ref 94–109)
CHOLEST SERPL-MCNC: 174 MG/DL
CO2 SERPL-SCNC: 27 MMOL/L (ref 20–32)
CREAT SERPL-MCNC: 0.91 MG/DL (ref 0.66–1.25)
GFR SERPL CREATININE-BSD FRML MDRD: >90 ML/MIN/1.7M2
GLUCOSE SERPL-MCNC: 108 MG/DL (ref 70–99)
HDLC SERPL-MCNC: 48 MG/DL
LDLC SERPL CALC-MCNC: 94 MG/DL
N GONORRHOEA DNA SPEC QL NAA+PROBE: NEGATIVE
NONHDLC SERPL-MCNC: 126 MG/DL
POTASSIUM SERPL-SCNC: 4.4 MMOL/L (ref 3.4–5.3)
PROT SERPL-MCNC: 8.7 G/DL (ref 6.8–8.8)
SODIUM SERPL-SCNC: 139 MMOL/L (ref 133–144)
SPECIMEN SOURCE: NORMAL
SPECIMEN SOURCE: NORMAL
TRIGL SERPL-MCNC: 161 MG/DL
TSH SERPL DL<=0.005 MIU/L-ACNC: 2.81 MU/L (ref 0.4–4)

## 2017-08-31 ASSESSMENT — ANXIETY QUESTIONNAIRES: GAD7 TOTAL SCORE: 11

## 2017-09-01 LAB — LACOSAMIDE SERPL-MCNC: 6.8 UG/ML (ref 5–10)

## 2017-09-06 DIAGNOSIS — R73.09 ELEVATED GLUCOSE: Primary | ICD-10-CM

## 2017-09-21 DIAGNOSIS — K21.9 GASTROESOPHAGEAL REFLUX DISEASE WITHOUT ESOPHAGITIS: ICD-10-CM

## 2017-09-21 NOTE — TELEPHONE ENCOUNTER
Ranitidine      Last Written Prescription Date: 08/03/17  Last Fill Quantity: 60,  # refills: 0  (PLEASE GIVE REFILLS SINCE PATIENT HAD LABS ON 8/30)  Last Office Visit with [facility], [facility] or [facility] prescribing provider: 08/30/17

## 2017-10-09 DIAGNOSIS — R73.09 ELEVATED GLUCOSE: ICD-10-CM

## 2017-10-09 LAB — HBA1C MFR BLD: 6.2 % (ref 4.3–6)

## 2017-10-09 PROCEDURE — 83036 HEMOGLOBIN GLYCOSYLATED A1C: CPT | Performed by: INTERNAL MEDICINE

## 2017-10-09 PROCEDURE — 80053 COMPREHEN METABOLIC PANEL: CPT | Performed by: INTERNAL MEDICINE

## 2017-10-09 PROCEDURE — 36415 COLL VENOUS BLD VENIPUNCTURE: CPT | Performed by: INTERNAL MEDICINE

## 2017-10-10 ENCOUNTER — OFFICE VISIT (OUTPATIENT)
Dept: INTERNAL MEDICINE | Facility: CLINIC | Age: 22
End: 2017-10-10
Payer: COMMERCIAL

## 2017-10-10 VITALS
BODY MASS INDEX: 39.19 KG/M2 | TEMPERATURE: 97.7 F | SYSTOLIC BLOOD PRESSURE: 126 MMHG | WEIGHT: 279.9 LBS | OXYGEN SATURATION: 96 % | HEIGHT: 71 IN | DIASTOLIC BLOOD PRESSURE: 88 MMHG | HEART RATE: 64 BPM

## 2017-10-10 DIAGNOSIS — L21.9 SEBORRHEIC DERMATITIS OF SCALP: Primary | ICD-10-CM

## 2017-10-10 LAB
ALBUMIN SERPL-MCNC: 3.9 G/DL (ref 3.4–5)
ALP SERPL-CCNC: 224 U/L (ref 40–150)
ALT SERPL W P-5'-P-CCNC: 49 U/L (ref 0–70)
ANION GAP SERPL CALCULATED.3IONS-SCNC: 5 MMOL/L (ref 3–14)
AST SERPL W P-5'-P-CCNC: 27 U/L (ref 0–45)
BILIRUB SERPL-MCNC: 0.4 MG/DL (ref 0.2–1.3)
BUN SERPL-MCNC: 10 MG/DL (ref 7–30)
CALCIUM SERPL-MCNC: 9.6 MG/DL (ref 8.5–10.1)
CHLORIDE SERPL-SCNC: 103 MMOL/L (ref 94–109)
CO2 SERPL-SCNC: 32 MMOL/L (ref 20–32)
CREAT SERPL-MCNC: 1.07 MG/DL (ref 0.66–1.25)
GFR SERPL CREATININE-BSD FRML MDRD: 85 ML/MIN/1.7M2
GLUCOSE SERPL-MCNC: 91 MG/DL (ref 70–99)
POTASSIUM SERPL-SCNC: 5.1 MMOL/L (ref 3.4–5.3)
PROT SERPL-MCNC: 8.7 G/DL (ref 6.8–8.8)
SODIUM SERPL-SCNC: 140 MMOL/L (ref 133–144)

## 2017-10-10 PROCEDURE — 99213 OFFICE O/P EST LOW 20 MIN: CPT | Performed by: FAMILY MEDICINE

## 2017-10-10 RX ORDER — FLUOCINONIDE TOPICAL SOLUTION USP, 0.05% 0.5 MG/ML
SOLUTION TOPICAL
Qty: 60 ML | Refills: 0 | Status: SHIPPED | OUTPATIENT
Start: 2017-10-10 | End: 2017-12-19

## 2017-10-10 NOTE — PROGRESS NOTES
"CHIEF COMPLAINT    Check scalp      HISTORY    He says he's had this condition for a couple of months. He has itching and flaking. He wears a wrap over his head most of the time.    Patient Active Problem List   Diagnosis     Aggressive behavior     Substance abuse     Suicidal ideation     Mild major depression (H)     Generalized anxiety disorder     Psychosis     Epilepsy (H)     Non morbid obesity due to excess calories       REVIEW OF SYSTEMS    Unremarkable except as above.      Past Medical History:   Diagnosis Date     ADHD (attention deficit hyperactivity disorder)      Anxiety      Chemical dependency (H)      Depressive disorder      Seizures (H)        EXAM  /88 (BP Location: Right arm, Patient Position: Sitting, Cuff Size: Adult Large)  Pulse 64  Temp 97.7  F (36.5  C) (Oral)  Ht 5' 10.5\" (1.791 m)  Wt 279 lb 14.4 oz (127 kg)  SpO2 96%  BMI 39.59 kg/m2    Scalp: thick, flaky, tan diffuse involvement of entire hair bearing area. I suspect seborrheic although fungal is another consideration.      (L21.9) Seborrheic dermatitis of scalp  (primary encounter diagnosis)  Comment:   Plan: fluocinonide (LIDEX) 0.05 % solution,         DERMATOLOGY REFERRAL            Use Neutragena T Gel Shampoo.    Apply lotion twice daily.    See Dermatologist for opinion.  "

## 2017-10-10 NOTE — NURSING NOTE
"Chief Complaint   Patient presents with     Hair/Scalp Problem       Initial /88 (BP Location: Right arm, Patient Position: Sitting, Cuff Size: Adult Large)  Pulse 64  Temp 97.7  F (36.5  C) (Oral)  Ht 5' 10.5\" (1.791 m)  Wt 279 lb 14.4 oz (127 kg)  SpO2 96%  BMI 39.59 kg/m2 Estimated body mass index is 39.59 kg/(m^2) as calculated from the following:    Height as of this encounter: 5' 10.5\" (1.791 m).    Weight as of this encounter: 279 lb 14.4 oz (127 kg).  Medication Reconciliation: complete    "

## 2017-10-10 NOTE — MR AVS SNAPSHOT
After Visit Summary   10/10/2017    Farooq Sorto    MRN: [id number]           Patient Information     Date Of Birth          1995        Visit Information        Provider Department      10/10/2017 3:40 PM Дмитрий Olson MD [facility]        Today's Diagnoses     Seborrheic dermatitis of scalp    -  1      Care Instructions      Use Neutragena T Gel Shampoo.    Apply lotion twice daily.    See Dermatologist for opinion.          Follow-ups after your visit        Additional Services     DERMATOLOGY REFERRAL       Your provider has referred you to: [facility]: [facility] [place] [phone]    Please be aware that coverage of these services is subject to the terms and limitations of your health insurance plan.  Call member services at your health plan with any benefit or coverage questions.      Please bring the following with you to your appointment:    (1) Any X-Rays, CTs or MRIs which have been performed.  Contact the facility where they were done to arrange for  prior to your scheduled appointment.    (2) List of current medications  (3) This referral request   (4) Any documents/labs given to you for this referral                  Who to contact     If you have questions or need follow up information about today's clinic visit or your schedule please contact [facility] directly at [phone].  Normal or non-critical lab and imaging results will be communicated to you by MyChart, letter or phone within 4 business days after the clinic has received the results. If you do not hear from us within 7 days, please contact the clinic through MyChart or phone. If you have a critical or abnormal lab result, we will notify you by phone as soon as possible.  Submit refill requests through [employer] or call your pharmacy and they will forward the refill request to us. Please allow 3 business days for your refill to be completed.           "Additional Information About Your Visit        MyChart Information     CRV lets you send messages to your doctor, view your test results, renew your prescriptions, schedule appointments and more. To sign up, go to [email or web address] . Click on \"Log in\" on the left side of the screen, which will take you to the Welcome page. Then click on \"Sign up Now\" on the right side of the page.     You will be asked to enter the access code listed below, as well as some personal information. Please follow the directions to create your username and password.     Your access code is: [email or web address]  Expires: 10/24/2017  1:36 PM     Your access code will  in 90 days. If you need help or a new code, please call your [facility] clinic or [phone].        Care EveryWhere ID     This is your Care EveryWhere ID. This could be used by other organizations to access your [facility] medical records  [id number]        Your Vitals Were     Pulse Temperature Height Pulse Oximetry BMI (Body Mass Index)       64 97.7  F (36.5  C) (Oral) 5' 10.5\" (1.791 m) 96% 39.59 kg/m2        Blood Pressure from Last 3 Encounters:   10/10/17 126/88   17 130/70   17 130/82    Weight from Last 3 Encounters:   10/10/17 279 lb 14.4 oz (127 kg)   17 274 lb (124.3 kg)   17 247 lb 11.2 oz (112.4 kg)              We Performed the Following     DERMATOLOGY REFERRAL          Today's Medication Changes          These changes are accurate as of: 10/10/17  4:11 PM.  If you have any questions, ask your nurse or doctor.               Start taking these medicines.        Dose/Directions    fluocinonide 0.05 % solution   Commonly known as:  LIDEX   Used for:  Seborrheic dermatitis of scalp   Started by:  Дмитрий Olson MD        Apply to scalp morning and night.   Quantity:  60 mL   Refills:  0            Where to get your medicines      These medications were sent to [id number] - [place], [place] -  [address] AT [facility] " [place] [address]  [address] [place] [address]     Phone:  [phone]     fluocinonide 0.05 % solution                Primary Care Provider Office Phone # Fax #    Daina Tone Waldron [phone]       [address]  [facility] [address]        Equal Access to Services     KOLTON LOGAN : Hadii aad ku hadasho Soomaali, waaxda luqadaha, qaybta kaalmada adeegyada, waxay idiin hayaan adeeg khamenash laLisandraaan . [facility] [phone].    ATENCIÓN: Si habla español, tiene a eagle disposición servicios gratuitos de asistencia lingüística. Llame al [phone].    We comply with applicable federal civil rights laws and [place] laws. We do not discriminate on the basis of race, color, national origin, age, disability, sex, sexual orientation, or gender identity.            Thank you!     Thank you for choosing [facility]  for your care. Our goal is always to provide you with excellent care. Hearing back from our patients is one way we can continue to improve our services. Please take a few minutes to complete the written survey that you may receive in the mail after your visit with us. Thank you!             Your Updated Medication List - Protect others around you: Learn how to safely use, store and throw away your medicines at www.disposemymeds.org.          This list is accurate as of: 10/10/17  4:11 PM.  Always use your most recent med list.                   Brand Name Dispense Instructions for use Diagnosis    albuterol 108 (90 BASE) MCG/ACT Inhaler    PROAIR HFA/PROVENTIL HFA/VENTOLIN HFA    1 Inhaler    Inhale 2 puffs into the lungs every 6 hours as needed for shortness of breath / dyspnea or wheezing    SOB (shortness of breath)       amantadine 100 MG capsule    SYMMETREL     Take 100 mg by mouth 2 times daily        benzoyl peroxide 10 % topical gel     28 g    Apply topically 2 times daily    Acne vulgaris       clindamycin 300 MG capsule    CLEOCIN    40 capsule     Take 1 capsule (300 mg) by mouth 4 times daily    Skin pustule       CLONAZEPAM PO      Take 0.5 mg by mouth        fluocinonide 0.05 % solution    LIDEX    60 mL    Apply to scalp morning and night.    Seborrheic dermatitis of scalp       GUANFACINE HCL PO      Take 2 mg by mouth 2 times daily        ketoconazole 2 % shampoo    NIZORAL    120 mL    Apply to the affected area and wash off after 5 minutes. twice weekly for 2 to 4 weeks    Other seborrheic dermatitis       lamoTRIgine 200 MG tablet    LaMICtal     Take 500 mg by mouth 2 times daily        OLANZapine 10 MG tablet    zyPREXA    90 tablet    Take 1 tablet (10 mg) by mouth At Bedtime    Aggressive behavior, Agitation       ranitidine 150 MG tablet    ZANTAC    180 tablet    Take 1 tablet (150 mg) by mouth 2 times daily    Gastroesophageal reflux disease without esophagitis       VIMPAT PO      Take 100 mg by mouth

## 2017-11-27 ENCOUNTER — TRANSFERRED RECORDS (OUTPATIENT)
Dept: HEALTH INFORMATION MANAGEMENT | Facility: CLINIC | Age: 22
End: 2017-11-27

## 2017-12-18 ENCOUNTER — TELEPHONE (OUTPATIENT)
Dept: INTERNAL MEDICINE | Facility: CLINIC | Age: 22
End: 2017-12-18

## 2017-12-18 NOTE — TELEPHONE ENCOUNTER
"Call received from Mom stating that pt was seen previously for \"rash on his scalp\". States the rash is spreading to his neck and upper body. States pt also has \"purplish\" bumps on various areas on his body. Pt was also seen for this previously and was given an abx. Pt was recently seen by derm for the rash on his scalp. Pt has apt with PCP 12/20. Advised Mom f/u with derm as she feels the rx they gave pt is not helping and the rash is getting worse. Offered to look for apt in clinic tomorrow as there are no apts today. Mom will contact derm and see PCP for scheduled apt 12/20.  "

## 2017-12-19 ENCOUNTER — HOSPITAL ENCOUNTER (EMERGENCY)
Facility: CLINIC | Age: 22
Discharge: HOME | End: 2017-12-19
Attending: EMERGENCY MEDICINE | Admitting: EMERGENCY MEDICINE
Payer: COMMERCIAL

## 2017-12-19 ENCOUNTER — APPOINTMENT (OUTPATIENT)
Dept: GENERAL RADIOLOGY | Facility: CLINIC | Age: 22
End: 2017-12-19
Attending: NURSE PRACTITIONER
Payer: COMMERCIAL

## 2017-12-19 VITALS
SYSTOLIC BLOOD PRESSURE: 126 MMHG | HEART RATE: 92 BPM | OXYGEN SATURATION: 93 % | TEMPERATURE: 97.9 F | DIASTOLIC BLOOD PRESSURE: 81 MMHG | RESPIRATION RATE: 16 BRPM

## 2017-12-19 DIAGNOSIS — F41.9 ANXIETY: ICD-10-CM

## 2017-12-19 DIAGNOSIS — L73.9 FOLLICULITIS: ICD-10-CM

## 2017-12-19 LAB — INTERPRETATION ECG - MUSE: NORMAL

## 2017-12-19 PROCEDURE — 99284 EMERGENCY DEPT VISIT MOD MDM: CPT | Mod: 25

## 2017-12-19 PROCEDURE — 71020 XR CHEST 2 VW: CPT

## 2017-12-19 PROCEDURE — 93005 ELECTROCARDIOGRAM TRACING: CPT

## 2017-12-19 RX ORDER — DOXYCYCLINE 100 MG/1
100 CAPSULE ORAL 2 TIMES DAILY
Qty: 14 CAPSULE | Refills: 0 | Status: SHIPPED | OUTPATIENT
Start: 2017-12-19 | End: 2018-02-21

## 2017-12-19 ASSESSMENT — ENCOUNTER SYMPTOMS
FEVER: 0
DIARRHEA: 0
FATIGUE: 1
RESPIRATORY NEGATIVE: 1
MUSCULOSKELETAL NEGATIVE: 1
VOMITING: 0
SLEEP DISTURBANCE: 0
NAUSEA: 0

## 2017-12-19 NOTE — ED AVS SNAPSHOT
[facility] Emergency Department    [address]    [facility] [address]    Phone:  [phone]    Fax:  [phone]                                       Farooq Sorto   MRN: [id number]    Department:  [facility] Emergency Department   Date of Visit:  12/19/2017           After Visit Summary Signature Page     I have received my discharge instructions, and my questions have been answered. I have discussed any challenges I see with this plan with the nurse or doctor.    ..........................................................................................................................................  Patient/Patient Representative Signature      ..........................................................................................................................................  Patient Representative Print Name and Relationship to Patient    ..................................................               ................................................  Date                                            Time    ..........................................................................................................................................  Reviewed by Signature/Title    ...................................................              ..............................................  Date                                                            Time

## 2017-12-19 NOTE — ED NOTES
Pt provided with discharge paperwork and educated on recommended follow-up with PCP. Pt educated on how to take antibiotic appropriately. Pt voiced understanding and denied any questions at discharge. Pt ambulated to lobby with mother.

## 2017-12-19 NOTE — ED PROVIDER NOTES
Emergency Department Attending Supervision Note  12/19/2017  1:26 PM      I evaluated this patient in conjunction with Meme Jo NP    Briefly, the patient presented with anxiety. Presents from group home.  History is obtained from the patient, as well as mother and father, present at bedside.  Earlier today, patient reported symptoms of increased anxiety including hyperventilation, as well as tremulousness.  No seizure activity was noted.  Patient was scheduled for a court date today for issues regarding his stolen credit card.  Parents acknowledge he has been nervous, and anxious regarding this upcoming event for months.  Given his increased anxiety and tremulousness, EMS was contacted.  In route he received 1 mg of IV Ativan with improvement in symptoms.  He does acknowledge a history of underlying anxiety.  Here in the ED, he notes feeling improved although does have some dizziness.  Additionally he reports left-sided chest wall pain as he struck a banister while leaving his group home with EMS.  He denies associated shortness of breath.  Of additional concern is a rash to his head chest abdomen and extremities.  This has been present for months and he has undergone evaluation including dermatology.  The rash about his scalp is felt to be consistent with seborrheic dermatitis.  He denies associated fevers.  He has not noted skin sloughing.  Parents acknowledge change of medications recently including increased dose of 1 of his seizure medications although they are not sure which one.  No other medication changes are noted.  Denies prior history of cardiac arrhythmia, cardiac dysfunction, PE, prolonged immobilization, or surgeries.    On my exam:  Awake alert young male  Answers questions appropriately  Resting comfortably on gurney without hyperventilation  Lungs clear to auscultation bilaterally, no wheezing, rales, crackles  Regular rate and rhythm, S1 and S2 are present, no murmurs gallops or rubs  Abdomen  soft, nontender to light and deep palpation throughout  Full range of motion of upper and lower extremities  Thick scaling rash about scalp and underneath beard scaling rash to nape of neck and center of the chest as well as in the periumbilical region  Multiple erythematous papules scattered across the trunk, and upper and lower extremities with different appearance than rash localized about head and neck, this rash is centered around hair follicles   no skin desquamation  No vesicles or bullae  No mucous membrane involvement  No petechiae or purpura  No involvement of  palms of hands    My impression is:  1.  Anxiety: History is most consistent with acute anxiety reaction.  Patient acknowledges significant stress regarding his upcoming court date today.  Associated symptoms include hyperventilation and tremulousness, which improved following parenteral benzodiazepine administration.  Shortly after presentation to the ED, parents arrived at bedside.  Father did attend court appearance and the case was dismissed.  This has provided significant relief to the patient with improvements in his degree of anxiety.  He offers no thoughts of self-harm or suicide.  I feel this is unlikely to represent underlying cardiopulmonary disease such as ACS, dysrhythmia, or PE.  He does not have significant risk factors for PE.  EKG demonstrates sinus rhythm without evidence of dysrhythmia, prolonged QTC, Brugada syndrome, or WPW.    2. Rash: Patient presents with an extensive rash including his scalp, face, neck, thorax, abdomen, and extremities.  The rash about his scalp, and face is most suspicious for seborrheic dermatitis given the presence of thick, yellow scales.  He has undergone prior evaluation with both his PCP as well as dermatology.  He now notes associated rash to the trunk as well as extremities.  This is suspicious for folliculitis given the papular appearance of the rash, as well as localization around the hair  follicles.  Some of these appear pustular in nature.  No prior history of MRSA.  Given the diffuse nature of his skin involvement, do feel he may benefit from a trial of oral antibiotics.  Given penicillin allergy, will start with doxycycline, 100 mg twice daily ×7 days.  Although the exact etiology of this rash is not entirely clear, I do not feel that it is acutely life-threatening.  There is no evidence of mucous membrane involvement, skin desquamation, petechiae, purpura, vesicles or bullae.  I do not feel this is consistent with Palma-Russell syndrome, or toxic epidermal necrolysis.  Patient does have a follow-up appointment with his primary care provider tomorrow.  I recommended to continue with this.  He will likely benefit from further evaluation with dermatology.  Patient and parents felt comfortable with discharge home at this time.  They are certainly welcome to return to the ER at any point with new or troubling symptoms.  All questions were answered prior to discharge.    Diagnosis    ICD-10-CM    1. Anxiety F41.9    2. Folliculitis L73.9          Jarred Rodriguez MD Roach, Brian Donald, MD  12/19/17 1921

## 2017-12-19 NOTE — ED NOTES
Bed: ED07  Expected date: 12/19/17  Expected time: 12:54 PM  Means of arrival: Ambulance  Comments:  Giancarlo 23m

## 2017-12-19 NOTE — ED AVS SNAPSHOT
[facility] Emergency Department    [address]    [facility] [address]    Phone:  [phone]    Fax:  [phone]                                       Farooq Sorto   MRN: [id number]    Department:  [facility] Emergency Department   Date of Visit:  12/19/2017           Patient Information     Date Of Birth          1995        Your diagnoses for this visit were:     Anxiety     Folliculitis        You were seen by Jarred Rodriguez MD.      Follow-up Information     Follow up with Daina Waldron MD In 1 day.    Specialty:  Internal Medicine    Contact information:    [address] [facility] [address]  [phone]          Follow up with [facility] Emergency Department.    Specialty:  EMERGENCY MEDICINE    Why:  As needed    Contact information:    [address]  [facility] [address]  [phone]        Discharge Instructions       Take all 7 days of antibiotics.  Follow-up with your primary MD tomorrow and dermatology in 3 weeks as scheduled.  Return to ED with fever, shortness of breath, chest pain, lightheadedness or if you faint.        Understanding Anxiety Disorders  Almost everyone gets nervous now and then. It s normal to have knots in your stomach before a test, or for your heart to race on a first date. But an anxiety disorder is much more than a case of nerves. In fact, its symptoms may be overwhelming. But treatment can relieve many of these symptoms. Talking to your healthcare provider is the first step.    What are anxiety disorders?  An anxiety disorder causes intense feelings of panic and fear. These feelings may arise for no apparent reason. And they tend to recur again and again. They may prevent you from coping with life and cause you great distress. As a result, you may avoid anything that triggers your fear. In extreme cases, you may never leave the house. Anxiety disorders may cause other  symptoms, such as:    Obsessive thoughts you can t control    Constant nightmares or painful thoughts of the past    Nausea, sweating, and muscle tension    Trouble sleeping or concentrating  What causes anxiety disorders?  Anxiety disorders tend to run in families. For some people, childhood abuse or neglect may play a role. For others, stressful life events or trauma may trigger anxiety disorders. Anxiety can trigger low self-esteem and poor coping skills.  Common anxiety disorders    Panic disorder. This causes an intense fear of being in danger.    Phobias. These are extreme fears of certain objects, places, or events.    Obsessive-compulsive disorder. This causes you to have unwanted thoughts and urges. You also may perform certain actions over and over.    Posttraumatic stress disorder. This occurs in people who have survived a terrible ordeal. It can cause nightmares and flashbacks about the event.    Generalized anxiety disorder. This causes constant worry that can greatly disrupt your life.   Getting better  You may believe that nothing can help you. Or, you might fear what others may think. But most anxiety symptoms can be eased. Having an anxiety disorder is nothing to be ashamed of. Most people do best with treatment that combines medicine and therapy. These aren t cures. But they can help you live a healthier life.  Date Last Reviewed: 2/1/2017 2000-2017 [employer]. [address] [place] [address], [place] [address]. All rights reserved. This information is not intended as a substitute for professional medical care. Always follow your healthcare professional's instructions.          Treating Anxiety Disorders with Therapy    If you have an anxiety disorder, you don t have to suffer anymore. Treatment is available. Therapy (also called counseling) is often a helpful treatment for anxiety disorders. With therapy, a specially trained professional (therapist) helps you face and learn to manage your  anxiety. Therapy can be short-term or long-term depending on your needs. In some cases, medicine may also be prescribed with therapy. It may take time before you notice how much therapy is helping, but stick with it. With therapy, you can feel better.  Cognitive behavioral therapy (CBT)  Cognitive behavioral therapy (CBT) teaches you to manage anxiety. It does this by helping you understand how you think and act when you re anxious. Research has shown CBT to be a very effective treatment for anxiety disorders. How CBT is run is almost like a class. It involves homework and activities to build skills that teach you to cope with anxiety step by step. It can be done in a group or one-on-one, and often takes place for a set number of sessions. CBT has two main parts:    Cognitive therapy helps you identify the negative, irrational thoughts that occur with your anxiety. You ll learn to replace these with more positive, realistic thoughts.    Behavioral therapy helps you change how you react to anxiety. You ll learn coping skills and methods for relaxing to help you better deal with anxiety.  Other forms of therapy  Other therapy methods may work better for you than CBT. Or, you may move from CBT to another form of therapy as your treatment needs change. This may mean meeting with a therapist by yourself or in a group. Therapy can also help you work through problems in your life, such as drug or alcohol dependence, that may be making your anxiety worse.  Getting better takes time  Therapy will help you feel better and teach you skills to help manage anxiety long term. But change doesn t happen right away. It takes a commitment from you. And treatment only works if you learn to face the causes of your anxiety. So, you might feel worse before you feel better. This can sometimes make it hard to stick with it. But remember: Therapy is a very effective treatment. The results will be well worth it.  Helping yourself  If anxiety  is wearing you down, here are some things you can do to cope:    Check with your doctor and rule out any physical problems that may be causing the anxiety symptoms.    If an anxiety disorder is diagnosed, seek mental healthcare. This is an illness and it can respond to treatment. Most types of anxiety disorders will respond to talk therapy and medicine.    Educate yourself about anxiety disorders. Keep track of helpful online resources and books you can use during stressful periods.    Try stress management techniques such as meditation.    Consider online or in-person support groups.    Don t fight your feelings. Anxiety feeds itself. The more you worry about it, the worse it gets. Instead, try to identify what might have triggered your anxiety. Then try to put this threat in perspective.    Keep in mind that you can t control everything about a situation. Change what you can and let the rest take its course.    Exercise -- it s a great way to relieve tension and help your body feel relaxed.    Examine your life for stress, and try to find ways to reduce it.    Avoid caffeine and nicotine, which can make anxiety symptoms worse.    Fight the temptation to turn to alcohol or unprescribed drugs for relief. They only make things worse in the long run.   Date Last Reviewed: 1/1/2017 2000-2017 [employer]. [address] [place] [address], [place] [address]. All rights reserved. This information is not intended as a substitute for professional medical care. Always follow your healthcare professional's instructions.          Discharge References/Attachments     FOLLICULITIS (ENGLISH)      Future Appointments        Provider Department Dept Phone Center    2/2/2018 1:00 PM Mariel Landeros PA-C [facility] [phone]       24 Hour Appointment Hotline       To make an appointment at any [facility], call [phone] ([phone]). If you don't have a family doctor or clinic, we  will help you find one. [facility] are conveniently located to serve the needs of you and your family.             Review of your medicines      START taking        Dose / Directions Last dose taken    doxycycline 100 MG capsule   Commonly known as:  VIBRAMYCIN   Dose:  100 mg   Quantity:  14 capsule        Take 1 capsule (100 mg) by mouth 2 times daily   Refills:  0          Our records show that you are taking the medicines listed below. If these are incorrect, please call your family doctor or clinic.        Dose / Directions Last dose taken    ACETAMINOPHEN PO   Dose:  650 mg        Take 650 mg by mouth every 4 hours as needed for pain   Refills:  0        amantadine 100 MG capsule   Commonly known as:  SYMMETREL   Dose:  100 mg        Take 100 mg by mouth 2 times daily   Refills:  0        benzoyl peroxide 10 % topical gel   Quantity:  28 g        Apply topically 2 times daily   Refills:  3        CLONAZEPAM PO   Dose:  0.5 mg        Take 0.5 mg by mouth   Refills:  0        GUANFACINE HCL PO   Dose:  2 mg        Take 2 mg by mouth 2 times daily   Refills:  0        IBUPROFEN PO   Dose:  600 mg        Take 600 mg by mouth every 6 hours as needed for moderate pain   Refills:  0        lamoTRIgine 200 MG tablet   Commonly known as:  LaMICtal   Dose:  500 mg        Take 500 mg by mouth 2 times daily   Refills:  0        OLANZapine 10 MG tablet   Commonly known as:  zyPREXA   Dose:  10 mg   Quantity:  90 tablet        Take 1 tablet (10 mg) by mouth At Bedtime   Refills:  0        ranitidine 150 MG tablet   Commonly known as:  ZANTAC   Dose:  150 mg   Quantity:  180 tablet        Take 1 tablet (150 mg) by mouth 2 times daily   Refills:  1        VIMPAT PO   Dose:  200 mg        Take 200 mg by mouth 2 times daily   Refills:  0                Prescriptions were sent or printed at these locations (1 Prescription)                   Other Prescriptions                Printed at Department/Unit printer (1 of 1)          doxycycline (VIBRAMYCIN) 100 MG capsule                Procedures and tests performed during your visit     EKG 12-lead, tracing only    XR Chest 2 Views      Orders Needing Specimen Collection     None      Pending Results     Date and Time Order Name Status Description    12/19/2017 1353 EKG 12-lead, tracing only Preliminary             Pending Culture Results     No orders found from 12/17/2017 to 12/20/2017.            Pending Results Instructions     If you had any lab results that were not finalized at the time of your Discharge, you can call the ED Lab Result RN at [phone]. You will be contacted by this team for any positive Lab results or changes in treatment. The nurses are available 7 days a week from 10A to 6:30P.  You can leave a message 24 hours per day and they will return your call.        Test Results From Your Hospital Stay        12/19/2017  3:23 PM      Narrative     XR CHEST 2 VW 12/19/2017 3:13 PM    HISTORY: dizziness; fall with chest pain;         Impression     IMPRESSION: Negative.    KATARINA SAMSON MD                Clinical Quality Measure: Blood Pressure Screening     Your blood pressure was checked while you were in the emergency department today. The last reading we obtained was  BP: (!) 136/98 . Please read the guidelines below about what these numbers mean and what you should do about them.  If your systolic blood pressure (the top number) is less than 120 and your diastolic blood pressure (the bottom number) is less than 80, then your blood pressure is normal. There is nothing more that you need to do about it.  If your systolic blood pressure (the top number) is 120-139 or your diastolic blood pressure (the bottom number) is 80-89, your blood pressure may be higher than it should be. You should have your blood pressure rechecked within a year by a primary care provider.  If your systolic blood pressure (the top number) is 140 or greater or your diastolic blood pressure (the  "bottom number) is 90 or greater, you may have high blood pressure. High blood pressure is treatable, but if left untreated over time it can put you at risk for heart attack, stroke, or kidney failure. You should have your blood pressure rechecked by a primary care provider within the next 4 weeks.  If your provider in the emergency department today gave you specific instructions to follow-up with your doctor or provider even sooner than that, you should follow that instruction and not wait for up to 4 weeks for your follow-up visit.        Thank you for choosing [facility]       Thank you for choosing [facility] for your care. Our goal is always to provide you with excellent care. Hearing back from our patients is one way we can continue to improve our services. Please take a few minutes to complete the written survey that you may receive in the mail after you visit with us. Thank you!        [employer] Information     [employer] lets you send messages to your doctor, view your test results, renew your prescriptions, schedule appointments and more. To sign up, go to [email or web address] . Click on \"Log in\" on the left side of the screen, which will take you to the Welcome page. Then click on \"Sign up Now\" on the right side of the page.     You will be asked to enter the access code listed below, as well as some personal information. Please follow the directions to create your username and password.     Your access code is: [email or web address]  Expires: 3/19/2018  3:33 PM     Your access code will  in 90 days. If you need help or a new code, please call your [facility] clinic or [phone].        Care EveryWhere ID     This is your Care EveryWhere ID. This could be used by other organizations to access your [facility] medical records  [id number]        Equal Access to Services     KOLTON LOGAN : mirela Christian, janny vega. So tj " [phone].    ATENCIÓN: Si habla español, tiene a eagle disposición servicios gratuitos de asistencia lingüística. Llame al [phone].    We comply with applicable federal civil rights laws and [place] laws. We do not discriminate on the basis of race, color, national origin, age, disability, sex, sexual orientation, or gender identity.            After Visit Summary       This is your record. Keep this with you and show to your community pharmacist(s) and doctor(s) at your next visit.

## 2017-12-19 NOTE — ED NOTES
"BIB EMS from group home after reported period of anxiety/agitation about court today; EMS states they were told patient had \"uncontrolled shaking\" but no loss of consciousness or apparent seizure; pt has hx of anxiety and down syndrome.  EMS gave 1 mg ativan IV with good relief of anxiety s/s.  Pt reports pain to left chest from \"running into the bannister on my way down to my room\", states it was accidental.  Also has reddened rash to chest x 2 weeks.  Appointment with provider scheduled tomorrow.  Caregiver on the way.  "

## 2017-12-19 NOTE — ED PROVIDER NOTES
History     Chief Complaint:    Anxiety    HPI     Farooq Sorto is a 22 year old male with history Down Syndrome, epilepsy, anxiety who presents via EMS from group home with anxiety.  Per EMS, they were called to group home when patient began experiencing anxiety and shaking due to scheduled court date this afternoon.  They report no loss of consciousness to suggest seizures.  He was given 1 mg IV ativan and transferred to ED.  Per patient, he reports feeling anxiety surrounding court date today and began experiencing dizziness, blurry vision and shaking.  He reports his anxiety has improved but he is still having some dizziness and blurry vision.  He reports left sided chest wall pain after he ran into a banister while attempting to walk to EMS rig.  He denies feeling short of breath.  He notes pain to his face, scalp, chest and abdomen from a rash that has developed over the last week. Additional history from parents revealed that patient was recently started on new seizure medication, but are unsure which medication was started.  Further mother is concerned with rash that began on scalp and forehead and has now spread to involve chest, abdomen and arms.      Allergies:  Penicillins Rash     Medications:    ACETAMINOPHEN PO  IBUPROFEN PO  ranitidine (ZANTAC) 150 MG tablet  benzoyl peroxide 10 % topical ge  OLANZapine (ZYPREXA) 10 MG tablet  CLONAZEPAM PO  Lacosamide (VIMPAT PO)  GUANFACINE HCL PO  lamoTRIgine (LAMICTAL) 200 MG tablet  amantadine (SYMMETREL) 100 MG capsule    Past Medical History:    ADHD (attention deficit hyperactivity disorder)  Anxiety  Chemical dependency (H)  Depressive disorder  Down syndrome  Seizures (H)    Past Surgical History:    BACK SURGERY  HEAD & NECK SURGERY mole removed from head 1994  SOFT TISSUE SURGER Hypospadius repair 1994    Family History:    Mother: Seizure disorder  Father: Diabetes  Brother: Seizure disorder, asthma  Maternal Grandfather: Diabtes    Social  History:  Marital Status:  Single [1]  Smoking Status: Former Smoker. 1.00 year. Quit date 07/16/2016  Alcohol Use: No    Review of Systems   Constitutional: Positive for fatigue. Negative for fever.   Respiratory: Negative.    Cardiovascular: Positive for chest pain.   Gastrointestinal: Negative for diarrhea, nausea and vomiting.   Musculoskeletal: Negative.    Skin: Positive for rash.   Psychiatric/Behavioral: Negative for self-injury, sleep disturbance and suicidal ideas.   All other systems reviewed and are negative.      Physical Exam   First Vitals:  BP: (!) 136/98  Pulse: 92  Heart Rate: 92  Temp: 97.9  F (36.6  C)  Resp: 16  SpO2: 93 %      Physical Exam  Constitutional: Alert, attentive, GCS 15.    HENT:  Oropharynx is clear without erythema or exudates, mucous membranes are moist.   Eyes: EOM are normal. Pupils are equal, round, and reactive to light. Conjunctiva pink, no scleral icterus or conjunctival injection  CV: regular rate and rhythm; no murmurs, rubs or gallups.  Radial, dorsalis pedis and posterior tibial pulses 2+ bilaterally.  Cap refill <3 seconds.  Respiratory: Effort normal. Lungs clear to auscultation bilaterally. No crackles/rubs/wheezes.  Good air movement.  GI:  There is no tenderness. No distension. Normal bowel sounds.  MSK: Normal range of motion. No peripheral edema or calf tenderness.  Neurological: Alert and oriented to person/place/time.  Neck supple. no aphasia/facial droop/dysarthria, tongue midline, symmetric palatal elevation, normal strength at SCM/trapezius/BUE/BLE, normal coordination to FNF at BUE, normal casual gait, no pronator drift, sensation intact to LT over face/BUE/BLE  Skin: Red plaques over bilateral forehead extending to level of brow; erythematous plaques with yellow scales extending into hair with resultant alopecia.  Multiple maculopapular lesions noted across chest wall, down through abdomen and on bilateral arms that seem to be localized mainly around  follicles.  Umbilical region with large erythematous plaque and scales.  No petechiae.  Psychiatric: Normal affect.      Emergency Department Course     ECG (14:03:31):  Rate 98 bpm. IL interval 186. QRS duration 110. QT/QTc 368/469. P-R-T axes 37 29 10. Normal sinus rhythm. Normal ECG. Interpreted at 1413 by Jarred Rodriguez MD.     Imaging:  Radiographic findings were communicated with the family who voiced understanding of the findings.    XR Chest 2 Views: Negative    Emergency Department Course:  Past medical records, nursing notes, and vitals reviewed.  I performed an exam of the patient and obtained history, as documented above.  MD Rodriguez in to evaluate patient.  Patient sent for x-ray while in ED with results above.  I rechecked the patient. Parents at bedside.  He is no longer feeling anxious or dizzy.  Ambulated without difficulty.  Findings and plan explained to the Patient and parents. Patient was discharged.      Impression & Plan      Medical Decision Making:  Farooq Sorto is a 22 year old male who presents for evaluation of anxiety.  There is a history of anxiety in the past.  Today's episode seems to be related to situational stress.  He was given 1 mg IV ativan by EMS and feels improved after with resolution of dizziness and blurred vision. There is no signs at this point of a general medical problem causing anxiety (PE, hyperthyroidism, other metabolic derangement, infection, etc).  EKG and chest x-ray were reassuring.  There are no signs of serious decompensation warranting psychiatric hospitalization or 72 hold (no suicidal or homicidal ideation, no danger to self).  Supportive outpatient management is therefore indicated.  He was noted to incidentally have diffuse rash.  Scalp and forehead lesions are consistent with seborrheic dermatitis, which is currently being treated.  Chest and abdomen involvement most likely folliculitis and patient was started on oral antibiotics due to  widespread distribution.  Patient and parents updated that antibiotics will help lesions due to folliculitis only and he will still require follow-up with dermatologist.  He has no fever and no signs of systemic illness. Patient has follow-up with his primary MD tomorrow which his mother states she is planning on attending.  He was a follow-up appointment with dermatology in three weeks as well.         Diagnosis:    ICD-10-CM    1. Anxiety F41.9    2. Folliculitis L73.9        Disposition:  discharged to home    Discharge Medications:  New Prescriptions    No medications on file         Meme Jo  12/19/2017   [facility] EMERGENCY DEPARTMENT       Meme Jo, APRN CNP  12/19/17 1528

## 2017-12-19 NOTE — DISCHARGE INSTRUCTIONS
Take all 7 days of antibiotics.  Follow-up with your primary MD tomorrow and dermatology in 3 weeks as scheduled.  Return to ED with fever, shortness of breath, chest pain, lightheadedness or if you faint.        Understanding Anxiety Disorders  Almost everyone gets nervous now and then. It s normal to have knots in your stomach before a test, or for your heart to race on a first date. But an anxiety disorder is much more than a case of nerves. In fact, its symptoms may be overwhelming. But treatment can relieve many of these symptoms. Talking to your healthcare provider is the first step.    What are anxiety disorders?  An anxiety disorder causes intense feelings of panic and fear. These feelings may arise for no apparent reason. And they tend to recur again and again. They may prevent you from coping with life and cause you great distress. As a result, you may avoid anything that triggers your fear. In extreme cases, you may never leave the house. Anxiety disorders may cause other symptoms, such as:    Obsessive thoughts you can t control    Constant nightmares or painful thoughts of the past    Nausea, sweating, and muscle tension    Trouble sleeping or concentrating  What causes anxiety disorders?  Anxiety disorders tend to run in families. For some people, childhood abuse or neglect may play a role. For others, stressful life events or trauma may trigger anxiety disorders. Anxiety can trigger low self-esteem and poor coping skills.  Common anxiety disorders    Panic disorder. This causes an intense fear of being in danger.    Phobias. These are extreme fears of certain objects, places, or events.    Obsessive-compulsive disorder. This causes you to have unwanted thoughts and urges. You also may perform certain actions over and over.    Posttraumatic stress disorder. This occurs in people who have survived a terrible ordeal. It can cause nightmares and flashbacks about the event.    Generalized anxiety disorder.  This causes constant worry that can greatly disrupt your life.   Getting better  You may believe that nothing can help you. Or, you might fear what others may think. But most anxiety symptoms can be eased. Having an anxiety disorder is nothing to be ashamed of. Most people do best with treatment that combines medicine and therapy. These aren t cures. But they can help you live a healthier life.  Date Last Reviewed: 2/1/2017 2000-2017 [employer]. [address] [place] [address], [place] [address]. All rights reserved. This information is not intended as a substitute for professional medical care. Always follow your healthcare professional's instructions.          Treating Anxiety Disorders with Therapy    If you have an anxiety disorder, you don t have to suffer anymore. Treatment is available. Therapy (also called counseling) is often a helpful treatment for anxiety disorders. With therapy, a specially trained professional (therapist) helps you face and learn to manage your anxiety. Therapy can be short-term or long-term depending on your needs. In some cases, medicine may also be prescribed with therapy. It may take time before you notice how much therapy is helping, but stick with it. With therapy, you can feel better.  Cognitive behavioral therapy (CBT)  Cognitive behavioral therapy (CBT) teaches you to manage anxiety. It does this by helping you understand how you think and act when you re anxious. Research has shown CBT to be a very effective treatment for anxiety disorders. How CBT is run is almost like a class. It involves homework and activities to build skills that teach you to cope with anxiety step by step. It can be done in a group or one-on-one, and often takes place for a set number of sessions. CBT has two main parts:    Cognitive therapy helps you identify the negative, irrational thoughts that occur with your anxiety. You ll learn to replace these with more positive, realistic  thoughts.    Behavioral therapy helps you change how you react to anxiety. You ll learn coping skills and methods for relaxing to help you better deal with anxiety.  Other forms of therapy  Other therapy methods may work better for you than CBT. Or, you may move from CBT to another form of therapy as your treatment needs change. This may mean meeting with a therapist by yourself or in a group. Therapy can also help you work through problems in your life, such as drug or alcohol dependence, that may be making your anxiety worse.  Getting better takes time  Therapy will help you feel better and teach you skills to help manage anxiety long term. But change doesn t happen right away. It takes a commitment from you. And treatment only works if you learn to face the causes of your anxiety. So, you might feel worse before you feel better. This can sometimes make it hard to stick with it. But remember: Therapy is a very effective treatment. The results will be well worth it.  Helping yourself  If anxiety is wearing you down, here are some things you can do to cope:    Check with your doctor and rule out any physical problems that may be causing the anxiety symptoms.    If an anxiety disorder is diagnosed, seek mental healthcare. This is an illness and it can respond to treatment. Most types of anxiety disorders will respond to talk therapy and medicine.    Educate yourself about anxiety disorders. Keep track of helpful online resources and books you can use during stressful periods.    Try stress management techniques such as meditation.    Consider online or in-person support groups.    Don t fight your feelings. Anxiety feeds itself. The more you worry about it, the worse it gets. Instead, try to identify what might have triggered your anxiety. Then try to put this threat in perspective.    Keep in mind that you can t control everything about a situation. Change what you can and let the rest take its course.    Exercise  -- it s a great way to relieve tension and help your body feel relaxed.    Examine your life for stress, and try to find ways to reduce it.    Avoid caffeine and nicotine, which can make anxiety symptoms worse.    Fight the temptation to turn to alcohol or unprescribed drugs for relief. They only make things worse in the long run.   Date Last Reviewed: 1/1/2017 2000-2017 [employer]. [address] [place] [address], [place] [address]. All rights reserved. This information is not intended as a substitute for professional medical care. Always follow your healthcare professional's instructions.

## 2017-12-20 ENCOUNTER — OFFICE VISIT (OUTPATIENT)
Dept: INTERNAL MEDICINE | Facility: CLINIC | Age: 22
End: 2017-12-20
Payer: COMMERCIAL

## 2017-12-20 VITALS
WEIGHT: 270.5 LBS | HEART RATE: 110 BPM | HEIGHT: 71 IN | BODY MASS INDEX: 37.87 KG/M2 | OXYGEN SATURATION: 98 % | RESPIRATION RATE: 16 BRPM | SYSTOLIC BLOOD PRESSURE: 116 MMHG | DIASTOLIC BLOOD PRESSURE: 78 MMHG | TEMPERATURE: 97.8 F

## 2017-12-20 DIAGNOSIS — L73.9 FOLLICULITIS: Primary | ICD-10-CM

## 2017-12-20 DIAGNOSIS — F41.1 GENERALIZED ANXIETY DISORDER: ICD-10-CM

## 2017-12-20 PROCEDURE — 99213 OFFICE O/P EST LOW 20 MIN: CPT | Performed by: NURSE PRACTITIONER

## 2017-12-20 RX ORDER — HYDROXYZINE HYDROCHLORIDE 25 MG/1
25-50 TABLET, FILM COATED ORAL EVERY 6 HOURS PRN
Qty: 60 TABLET | Refills: 1 | Status: SHIPPED | OUTPATIENT
Start: 2017-12-20 | End: 2018-04-02

## 2017-12-20 NOTE — NURSING NOTE
"Chief Complaint   Patient presents with     Follow Up For     [facility] 12/19/2017       Initial /78 (BP Location: Right arm, Patient Position: Sitting, Cuff Size: Adult Large)  Pulse 110  Temp 97.8  F (36.6  C) (Oral)  Resp 16  Ht 5' 10.5\" (1.791 m)  Wt 270 lb 8 oz (122.7 kg)  SpO2 98%  BMI 38.26 kg/m2 Estimated body mass index is 38.26 kg/(m^2) as calculated from the following:    Height as of this encounter: 5' 10.5\" (1.791 m).    Weight as of this encounter: 270 lb 8 oz (122.7 kg).  Medication Reconciliation: complete    "

## 2017-12-20 NOTE — MR AVS SNAPSHOT
"              After Visit Summary   12/20/2017    Farooq Sorto    MRN: [id number]           Patient Information     Date Of Birth          1995        Visit Information        Provider Department      12/20/2017 3:40 PM Kia Davis NP [facility]        Today's Diagnoses     Folliculitis    -  1    Generalized anxiety disorder           Follow-ups after your visit        Your next 10 appointments already scheduled     Feb 02, 2018  1:00 PM CST   New Visit with Mariel Angel PA-C   [facility] [place] [place]    [address] [place] [id number] [phone]              Who to contact     If you have questions or need follow up information about today's clinic visit or your schedule please contact [facility] directly at [phone].  Normal or non-critical lab and imaging results will be communicated to you by MyChart, letter or phone within 4 business days after the clinic has received the results. If you do not hear from us within 7 days, please contact the clinic through MyChart or phone. If you have a critical or abnormal lab result, we will notify you by phone as soon as possible.  Submit refill requests through [employer] or call your pharmacy and they will forward the refill request to us. Please allow 3 business days for your refill to be completed.          Additional Information About Your Visit        MyChart Information     [employer] lets you send messages to your doctor, view your test results, renew your prescriptions, schedule appointments and more. To sign up, go to [email or web address] . Click on \"Log in\" on the left side of the screen, which will take you to the Welcome page. Then click on \"Sign up Now\" on the right side of the page.     You will be asked to enter the access code listed below, as well as some personal information. Please follow the directions to create your " "username and password.     Your access code is: [email or web address]  Expires: 3/19/2018  3:33 PM     Your access code will  in 90 days. If you need help or a new code, please call your [facility] or [phone].        Care EveryWhere ID     This is your Care EveryWhere ID. This could be used by other organizations to access your [facility] medical records  [id number]        Your Vitals Were     Pulse Temperature Respirations Height Pulse Oximetry BMI (Body Mass Index)    110 97.8  F (36.6  C) (Oral) 16 5' 10.5\" (1.791 m) 98% 38.26 kg/m2       Blood Pressure from Last 3 Encounters:   17 116/78   17 126/81   10/10/17 126/88    Weight from Last 3 Encounters:   17 270 lb 8 oz (122.7 kg)   10/10/17 279 lb 14.4 oz (127 kg)   17 274 lb (124.3 kg)              Today, you had the following     No orders found for display         Today's Medication Changes          These changes are accurate as of: 17  4:05 PM.  If you have any questions, ask your nurse or doctor.               Start taking these medicines.        Dose/Directions    hydrOXYzine 25 MG tablet   Commonly known as:  ATARAX   Used for:  Folliculitis   Started by:  Kia Davis, NP        Dose:  25-50 mg   Take 1-2 tablets (25-50 mg) by mouth every 6 hours as needed for itching   Quantity:  60 tablet   Refills:  1            Where to get your medicines      [id number]  [place] [address] AT [facility]   [address], [place] [address]     Phone:  [phone]     hydrOXYzine 25 MG tablet                Primary Care Provider Office Phone # Fax #    Daina Waldron [phone]       [address]  [facility] [address]        Equal Access to Services     KOLTON LOGAN AH: Chuy Mccarthy, mirela alaniz, qaybta kaalmada adejanny yanes. [facility] [phone].    ATENCIÓN: Si habla " español, tiene a eagle disposición servicios gratuitos de asistencia lingüística. Michael vazquez [phone].    We comply with applicable federal civil rights laws and [place] laws. We do not discriminate on the basis of race, color, national origin, age, disability, sex, sexual orientation, or gender identity.            Thank you!     Thank you for choosing [facility]  for your care. Our goal is always to provide you with excellent care. Hearing back from our patients is one way we can continue to improve our services. Please take a few minutes to complete the written survey that you may receive in the mail after your visit with us. Thank you!             Your Updated Medication List - Protect others around you: Learn how to safely use, store and throw away your medicines at www.disposemymeds.org.          This list is accurate as of: 12/20/17  4:05 PM.  Always use your most recent med list.                   Brand Name Dispense Instructions for use Diagnosis    ACETAMINOPHEN PO      Take 650 mg by mouth every 4 hours as needed for pain        amantadine 100 MG capsule    SYMMETREL     Take 100 mg by mouth 2 times daily        benzoyl peroxide 10 % topical gel     28 g    Apply topically 2 times daily    Acne vulgaris       CLONAZEPAM PO      Take 0.5 mg by mouth        doxycycline 100 MG capsule    VIBRAMYCIN    14 capsule    Take 1 capsule (100 mg) by mouth 2 times daily        GUANFACINE HCL PO      Take 2 mg by mouth 2 times daily        hydrOXYzine 25 MG tablet    ATARAX    60 tablet    Take 1-2 tablets (25-50 mg) by mouth every 6 hours as needed for itching    Folliculitis       IBUPROFEN PO      Take 600 mg by mouth every 6 hours as needed for moderate pain        lamoTRIgine 200 MG tablet    LaMICtal     Take 500 mg by mouth 2 times daily        OLANZapine 10 MG tablet    zyPREXA    90 tablet    Take 1 tablet (10 mg) by mouth At Bedtime    Aggressive behavior, Agitation       ranitidine 150 MG  tablet    ZANTAC    180 tablet    Take 1 tablet (150 mg) by mouth 2 times daily    Gastroesophageal reflux disease without esophagitis       VIMPAT PO      Take 200 mg by mouth 2 times daily

## 2017-12-20 NOTE — PROGRESS NOTES
SUBJECTIVE:   Farooq Sorto is a 22 year old male who presents to clinic today for the following health issues:    Here with parents.    ED/UC Followup:    Facility:  [facility]  Date of visit: 12/19/17  Reason for visit: anxiety, folliculitis, seborrheic dermatitis   Current Status: stable.  Folliculitis very itchy.  Under care of derm for seborrheic dermatitis.  Anxiety much improved.       Patient Active Problem List   Diagnosis     Aggressive behavior     Substance abuse     Suicidal ideation     Mild major depression (H)     Generalized anxiety disorder     Psychosis     Epilepsy (H)     Non morbid obesity due to excess calories     Past Surgical History:   Procedure Laterality Date     BACK SURGERY       HEAD & NECK SURGERY      mole removed from head 1994     SOFT TISSUE SURGERY      Hypospadius repair 1994       Social History   Substance Use Topics     Smoking status: Former Smoker     Years: 1.00     Quit date: 7/16/2016     Smokeless tobacco: Never Used      Comment: smoke when depress     Alcohol use No     Family History   Problem Relation Age of Onset     Seizure Disorder Mother      DIABETES Father      Seizure Disorder Brother      Asthma Brother      DIABETES Maternal Grandfather          Current Outpatient Prescriptions   Medication Sig Dispense Refill     hydrOXYzine (ATARAX) 25 MG tablet Take 1-2 tablets (25-50 mg) by mouth every 6 hours as needed for itching 60 tablet 1     ACETAMINOPHEN PO Take 650 mg by mouth every 4 hours as needed for pain       IBUPROFEN PO Take 600 mg by mouth every 6 hours as needed for moderate pain       doxycycline (VIBRAMYCIN) 100 MG capsule Take 1 capsule (100 mg) by mouth 2 times daily 14 capsule 0     ranitidine (ZANTAC) 150 MG tablet Take 1 tablet (150 mg) by mouth 2 times daily 180 tablet 1     benzoyl peroxide 10 % topical gel Apply topically 2 times daily 28 g 3     OLANZapine (ZYPREXA) 10 MG tablet Take 1 tablet (10 mg) by mouth At Bedtime 90 tablet 0      "CLONAZEPAM PO Take 0.5 mg by mouth       Lacosamide (VIMPAT PO) Take 200 mg by mouth 2 times daily        GUANFACINE HCL PO Take 2 mg by mouth 2 times daily       lamoTRIgine (LAMICTAL) 200 MG tablet Take 500 mg by mouth 2 times daily        amantadine (SYMMETREL) 100 MG capsule Take 100 mg by mouth 2 times daily        BP Readings from Last 3 Encounters:   12/20/17 116/78   12/19/17 126/81   10/10/17 126/88    Wt Readings from Last 3 Encounters:   12/20/17 270 lb 8 oz (122.7 kg)   10/10/17 279 lb 14.4 oz (127 kg)   08/30/17 274 lb (124.3 kg)                        Reviewed and updated as needed this visit by clinical staffTobacco  Allergies  Meds  Med Hx  Surg Hx  Fam Hx  Soc Hx      Reviewed and updated as needed this visit by Provider         ROS:  C: NEGATIVE for fever, chills, change in weight  E/M: NEGATIVE for ear, mouth and throat problems  R: NEGATIVE for significant cough or SOB  CV: NEGATIVE for chest pain, palpitations or peripheral edema    OBJECTIVE:     /78 (BP Location: Right arm, Patient Position: Sitting, Cuff Size: Adult Large)  Pulse 110  Temp 97.8  F (36.6  C) (Oral)  Resp 16  Ht 5' 10.5\" (1.791 m)  Wt 270 lb 8 oz (122.7 kg)  SpO2 98%  BMI 38.26 kg/m2  Body mass index is 38.26 kg/(m^2).  GENERAL: healthy, alert and no distress  SKIN: seborrheic dermatitis scalp, forehead, posterior neck.  Widespread folliculitis to trunk, arms with extensive excoriations.        ASSESSMENT/PLAN:               ICD-10-CM    1. Folliculitis L73.9 hydrOXYzine (ATARAX) 25 MG tablet   2. Generalized anxiety disorder F41.1        F/u derm as planned  No other med changes.  A total of 15 minutes was spent with the patient today, with greater than 50% of the visit involving counseling and coordination of care.      Kia Davis NP  [facility]    "

## 2018-01-30 ENCOUNTER — TRANSFERRED RECORDS (OUTPATIENT)
Dept: HEALTH INFORMATION MANAGEMENT | Facility: CLINIC | Age: 23
End: 2018-01-30

## 2018-02-13 ENCOUNTER — TRANSFERRED RECORDS (OUTPATIENT)
Dept: HEALTH INFORMATION MANAGEMENT | Facility: CLINIC | Age: 23
End: 2018-02-13

## 2018-02-21 ENCOUNTER — OFFICE VISIT (OUTPATIENT)
Dept: INTERNAL MEDICINE | Facility: CLINIC | Age: 23
End: 2018-02-21
Payer: COMMERCIAL

## 2018-02-21 VITALS
SYSTOLIC BLOOD PRESSURE: 118 MMHG | HEIGHT: 71 IN | BODY MASS INDEX: 37.8 KG/M2 | HEART RATE: 97 BPM | TEMPERATURE: 98.6 F | OXYGEN SATURATION: 94 % | WEIGHT: 270 LBS | DIASTOLIC BLOOD PRESSURE: 76 MMHG

## 2018-02-21 DIAGNOSIS — K62.89 RECTAL IRRITATION: Primary | ICD-10-CM

## 2018-02-21 PROCEDURE — 99213 OFFICE O/P EST LOW 20 MIN: CPT | Performed by: NURSE PRACTITIONER

## 2018-02-21 RX ORDER — MINERAL,LANOLIN OILS/PROP GLYC
LOTION (ML) TOPICAL
Qty: 90 ML | Refills: 0 | Status: SHIPPED | OUTPATIENT
Start: 2018-02-21 | End: 2018-10-24

## 2018-02-21 ASSESSMENT — PATIENT HEALTH QUESTIONNAIRE - PHQ9
SUM OF ALL RESPONSES TO PHQ QUESTIONS 1-9: 7
10. IF YOU CHECKED OFF ANY PROBLEMS, HOW DIFFICULT HAVE THESE PROBLEMS MADE IT FOR YOU TO DO YOUR WORK, TAKE CARE OF THINGS AT HOME, OR GET ALONG WITH OTHER PEOPLE: NOT DIFFICULT AT ALL
SUM OF ALL RESPONSES TO PHQ QUESTIONS 1-9: 7

## 2018-02-21 NOTE — PROGRESS NOTES
".  SUBJECTIVE:   Farooq Sorto is a 22 year old male who presents to clinic today for the following health issues:    Chief Complaint   Patient presents with     Rectal Problem     pt states x 1 month has had blood  from the rectal area when he washes and wiping. Question if hemmorhoid and is painful.    He does not have blood with stooling or in stool   He feels no lumps outside of rectal area   Feels like there is a bump between butt cheeks that he irritates with wiping               Problem list and histories reviewed & adjusted, as indicated.  Additional history: as documented    Patient Active Problem List   Diagnosis     Aggressive behavior     Substance abuse     Suicidal ideation     Mild major depression (H)     Generalized anxiety disorder     Psychosis     Epilepsy (H)     Non morbid obesity due to excess calories     Past Surgical History:   Procedure Laterality Date     BACK SURGERY       HEAD & NECK SURGERY      mole removed from head 1994     SOFT TISSUE SURGERY      Hypospadius repair 1994       Social History   Substance Use Topics     Smoking status: Former Smoker     Years: 1.00     Quit date: 7/16/2016     Smokeless tobacco: Never Used      Comment: smoke when depress     Alcohol use No     Family History   Problem Relation Age of Onset     Seizure Disorder Mother      DIABETES Father      Seizure Disorder Brother      Asthma Brother      DIABETES Maternal Grandfather            Reviewed and updated as needed this visit by clinical staff  Tobacco  Allergies  Meds  Med Hx  Surg Hx  Fam Hx  Soc Hx      Reviewed and updated as needed this visit by Provider  Allergies         ROS:  Constitutional, HEENT, cardiovascular, pulmonary, gi and gu systems are negative, except as otherwise noted.    OBJECTIVE:     /76 (BP Location: Left arm, Patient Position: Sitting, Cuff Size: Adult Large)  Pulse 97  Temp 98.6  F (37  C) (Oral)  Ht 5' 10.5\" (1.791 m)  Wt 270 lb (122.5 kg)  SpO2 94%  " BMI 38.19 kg/m2  Body mass index is 38.19 kg/(m^2).  GENERAL: alert and no distress- here with group home staff   RECTAL (male): no rectal masses  His bleeding comes from between butt cheeks he says and there are no scratches or scabs noted - no hemorrhoids seen today - the area between butt check is pink and somewhat painful to touch - no excess heat or drainage and no scabbing or blood   PSYCH: mentation appears normal, affect normal/bright    Diagnostic Test Results:  none     ASSESSMENT/PLAN:             1. Rectal irritation    - Incontinent Wash (BALNEOL) LOTN lotion; Apply to rectal area as needed for comfort  Dispense: 90 mL; Refill: 0    Patient Instructions   Balneol lotion to rectal area as needed for discomfort     If bleeding recurs may need to see him back       [facility]  Answers for HPI/ROS submitted by the patient on 2/21/2018   If you checked off any problems, how difficult have these problems made it for you to do your work, take care of things at home, or get along with other people?: Not difficult at all  PHQ9 TOTAL SCORE: 7

## 2018-02-21 NOTE — PATIENT INSTRUCTIONS
Balneol lotion to rectal area as needed for discomfort     If bleeding recurs may need to see him back

## 2018-02-21 NOTE — NURSING NOTE
".  Chief Complaint   Patient presents with     Rectal Problem     pt states x 1 month has had blood  from the rectal area when he washes and wiping. Question if hemmorhoid and is painful.       Initial /76 (BP Location: Left arm, Patient Position: Sitting, Cuff Size: Adult Large)  Pulse 97  Temp 98.6  F (37  C) (Oral)  Ht 5' 10.5\" (1.791 m)  Wt 270 lb (122.5 kg)  SpO2 94%  BMI 38.19 kg/m2 Estimated body mass index is 38.19 kg/(m^2) as calculated from the following:    Height as of this encounter: 5' 10.5\" (1.791 m).    Weight as of this encounter: 270 lb (122.5 kg).  Medication Reconciliation: complete    "

## 2018-02-22 ASSESSMENT — PATIENT HEALTH QUESTIONNAIRE - PHQ9: SUM OF ALL RESPONSES TO PHQ QUESTIONS 1-9: 7

## 2018-03-06 ENCOUNTER — TELEPHONE (OUTPATIENT)
Dept: INTERNAL MEDICINE | Facility: CLINIC | Age: 23
End: 2018-03-06

## 2018-03-06 NOTE — TELEPHONE ENCOUNTER
Prior Authorization Retail Medication Request    Updated Specialty Medication:  Medication/Dose:  ICD code (if different than what is on RX):  Previously Tried and Failed:    Important Lab Values:  Rationale:    Insurance Name:   Insurance ID: [id number]  Insurance Phone Number: [phone]    Pharmacy Information (if different than what is on RX)  Name:  Phone:      Updated Retail Medication Request:  Medication/Dose:  ICD code (if different than what is on RX):  Previously Tried and Failed:  Rationale:    Insurance Name:   Insurance ID:       Pharmacy Information (if different than what is on RX)  Name:  Phone:

## 2018-03-12 NOTE — TELEPHONE ENCOUNTER
Central Prior Authorization Team   Phone: [phone]    PA Initiation    Medication: Balneol Cleansing Lotion 89ML  Insurance Company: [employer] - Phone [phone] Fax [phone]  Pharmacy Filling the Rx: [id number] [place] - [address] AT [facility]  Filling Pharmacy Phone: [phone]  Filling Pharmacy Fax:    Start Date: 3/12/2018    Waiting on questions on CMM

## 2018-03-13 NOTE — TELEPHONE ENCOUNTER
Prior Authorization Approval    Authorization Effective Date: 3/13/2018  Authorization Expiration Date: 3/13/2019  Medication: Balneol Cleansing Lotion 89ML  Approved Dose/Quantity:    Reference #:     Insurance Company: [employer] - Phone [phone] Fax [phone]  Expected CoPay:       CoPay Card Available:      Foundation Assistance Needed:    Which Pharmacy is filling the prescription (Not needed for infusion/clinic administered): [facility] [address] [place] [address] - [address] AT [facility]  Pharmacy Notified: YesComment:  faxed approval to pharmacy.  Unable to reach by phone  Patient Notified: Yes

## 2018-03-19 DIAGNOSIS — K21.9 GASTROESOPHAGEAL REFLUX DISEASE WITHOUT ESOPHAGITIS: ICD-10-CM

## 2018-03-20 NOTE — TELEPHONE ENCOUNTER
"Requested Prescriptions   Pending Prescriptions Disp Refills     ranitidine (ZANTAC) 150 MG tablet [Pharmacy Med Name: RANITIDINE 150MG TABLETS] 180 tablet 0     Sig: TAKE 1 TABLET(150 MG) BY MOUTH TWICE DAILY    H2 Blockers Protocol Passed    3/19/2018  9:52 AM       Passed - Patient is age 12 or older       Passed - Recent (12 mo) or future (30 days) visit within the authorizing provider's specialty    Patient had office visit in the last 12 months or has a visit in the next 30 days with authorizing provider or within the authorizing provider's specialty.  See \"Patient Info\" tab in inbasket, or \"Choose Columns\" in Meds & Orders section of the refill encounter.            Last office visit 2/21/18.  Prescription approved per [facility] Refill Protocol.  Rosenda Fermin RN    "

## 2018-03-26 ENCOUNTER — TRANSFERRED RECORDS (OUTPATIENT)
Dept: HEALTH INFORMATION MANAGEMENT | Facility: CLINIC | Age: 23
End: 2018-03-26

## 2018-04-02 ENCOUNTER — OFFICE VISIT (OUTPATIENT)
Dept: INTERNAL MEDICINE | Facility: CLINIC | Age: 23
End: 2018-04-02
Payer: COMMERCIAL

## 2018-04-02 VITALS
WEIGHT: 269.8 LBS | DIASTOLIC BLOOD PRESSURE: 90 MMHG | OXYGEN SATURATION: 94 % | TEMPERATURE: 98 F | BODY MASS INDEX: 37.77 KG/M2 | HEART RATE: 64 BPM | SYSTOLIC BLOOD PRESSURE: 120 MMHG | HEIGHT: 71 IN

## 2018-04-02 DIAGNOSIS — K62.5 RECTAL BLEEDING: Primary | ICD-10-CM

## 2018-04-02 PROCEDURE — 99213 OFFICE O/P EST LOW 20 MIN: CPT | Performed by: INTERNAL MEDICINE

## 2018-04-02 RX ORDER — ACETAMINOPHEN 325 MG/1
325 TABLET ORAL EVERY 4 HOURS PRN
COMMUNITY
End: 2019-04-18

## 2018-04-02 RX ORDER — LEVETIRACETAM 500 MG/1
500 TABLET ORAL 2 TIMES DAILY
COMMUNITY

## 2018-04-02 RX ORDER — SENNOSIDES 8.6 MG
1 TABLET ORAL DAILY PRN
COMMUNITY
End: 2018-09-10

## 2018-04-02 NOTE — NURSING NOTE
"Chief Complaint   Patient presents with     Rectal Problem       Initial /90 (Cuff Size: Adult Large)  Pulse 64  Temp 98  F (36.7  C) (Oral)  Ht 5' 10.5\" (1.791 m)  Wt 269 lb 12.8 oz (122.4 kg)  SpO2 94%  BMI 38.17 kg/m2 Estimated body mass index is 38.17 kg/(m^2) as calculated from the following:    Height as of this encounter: 5' 10.5\" (1.791 m).    Weight as of this encounter: 269 lb 12.8 oz (122.4 kg).  Medication Reconciliation: complete    "

## 2018-04-02 NOTE — MR AVS SNAPSHOT
After Visit Summary   4/2/2018    Farooq Sorto    MRN: [id number]           Patient Information     Date Of Birth          1995        Visit Information        Provider Department      4/2/2018 1:40 PM Daina Waldron MD [facility]        Today's Diagnoses     Rectal bleeding    -  1       Follow-ups after your visit        Additional Services     COLORECTAL SURGERY REFERRAL       Your provider has referred you to: [facility] [phone]   [email or web address]    Referral Reason(s): Rectal Bleeding  Special Concerns: None  This referral is: Elective (week +)  It is OK to leave a message on patient's voicemail.    Please be aware that coverage of these services is subject to the terms and limitations of your health insurance plan.  Call member services at your health plan with any benefit or coverage questions.      Please bring the following with you to your appointment:    (1) Any X-Rays, CTs or MRIs which have been performed.  Contact the facility where they were done to arrange for  prior to your scheduled appointment.    (2) List of current medications  (3) This referral request   (4) Any documents/labs given to you for this referral                  Who to contact     If you have questions or need follow up information about today's clinic visit or your schedule please contact [facility] directly at [phone].  Normal or non-critical lab and imaging results will be communicated to you by MyChart, letter or phone within 4 business days after the clinic has received the results. If you do not hear from us within 7 days, please contact the clinic through MyChart or phone. If you have a critical or abnormal lab result, we will notify you by phone as soon as possible.  Submit refill requests through [employer] or call your pharmacy and they will forward the refill request to us. Please allow 3  "business days for your refill to be completed.          Additional Information About Your Visit        MyChart Information     [employer] lets you send messages to your doctor, view your test results, renew your prescriptions, schedule appointments and more. To sign up, go to [email or web address] . Click on \"Log in\" on the left side of the screen, which will take you to the Welcome page. Then click on \"Sign up Now\" on the right side of the page.     You will be asked to enter the access code listed below, as well as some personal information. Please follow the directions to create your username and password.     Your access code is: [email or web address]  Expires: 2018  2:27 PM     Your access code will  in 90 days. If you need help or a new code, please call your [place] clinic or [phone].        Care EveryWhere ID     This is your Care EveryWhere ID. This could be used by other organizations to access your [place] medical records  [id number]        Your Vitals Were     Pulse Temperature Height Pulse Oximetry BMI (Body Mass Index)       64 98  F (36.7  C) (Oral) 5' 10.5\" (1.791 m) 94% 38.17 kg/m2        Blood Pressure from Last 3 Encounters:   18 120/90   18 118/76   17 116/78    Weight from Last 3 Encounters:   18 269 lb 12.8 oz (122.4 kg)   18 270 lb (122.5 kg)   17 270 lb 8 oz (122.7 kg)              We Performed the Following     COLORECTAL SURGERY REFERRAL          Today's Medication Changes          These changes are accurate as of 18  2:27 PM.  If you have any questions, ask your nurse or doctor.               Start taking these medicines.        Dose/Directions    hydrocortisone 2.5 % cream   Commonly known as:  ANUSOL-HC   Used for:  Rectal bleeding   Started by:  Daina Waldron MD        Place rectally 2 times daily   Quantity:  30 g   Refills:  0            Where to get your medicines      These medications were sent to [id number] - " [place], [place] - [address] AT [facility]  [address], [place] [address]     Phone:  [phone]     hydrocortisone 2.5 % cream                Primary Care Provider Office Phone # Fax #    Daina Tone Waldron [phone]       [address]  [facility] [address]        Equal Access to Services     MARYLOU LOGAN : Hadii aad ku hadasho Soomaali, waaxda luqadaha, qaybta kaalmada adeegyada, waxay idiin hayaan adeeg kharash la'aan ah. So [facility] [phone].    ATENCIÓN: Si habla espkleber, tiene a eagle disposición servicios gratuitos de asistencia lingüística. Radhaame al [phone].    We comply with applicable federal civil rights laws and [place] laws. We do not discriminate on the basis of race, color, national origin, age, disability, sex, sexual orientation, or gender identity.            Thank you!     Thank you for choosing [facility]  for your care. Our goal is always to provide you with excellent care. Hearing back from our patients is one way we can continue to improve our services. Please take a few minutes to complete the written survey that you may receive in the mail after your visit with us. Thank you!             Your Updated Medication List - Protect others around you: Learn how to safely use, store and throw away your medicines at www.disposemymeds.org.          This list is accurate as of 4/2/18  2:27 PM.  Always use your most recent med list.                   Brand Name Dispense Instructions for use Diagnosis    amantadine 100 MG capsule    SYMMETREL     Take 100 mg by mouth 2 times daily        balneol Lotn lotion     90 mL    Apply to rectal area as needed for comfort    Rectal irritation       benzoyl peroxide 10 % topical gel     28 g    Apply topically 2 times daily    Acne vulgaris       CLONAZEPAM PO      Take 0.5 mg by mouth        GUANFACINE HCL PO      Take 2 mg by mouth 2 times daily        hydrocortisone 2.5 % cream    ANUSOL-HC    30 g     Place rectally 2 times daily    Rectal bleeding       IBUPROFEN PO      Take 600 mg by mouth every 6 hours as needed for moderate pain        lamoTRIgine 200 MG tablet    LaMICtal     Take 500 mg by mouth 2 times daily        levETIRAcetam 500 MG tablet    KEPPRA     Take 500 mg by mouth 2 times daily        OLANZapine 10 MG tablet    zyPREXA    90 tablet    Take 1 tablet (10 mg) by mouth At Bedtime    Aggressive behavior, Agitation       Q- MG tablet   Generic drug:  acetaminophen      Take 325 mg by mouth every 4 hours as needed for mild pain or fever        ranitidine 150 MG tablet    ZANTAC    180 tablet    TAKE 1 TABLET(150 MG) BY MOUTH TWICE DAILY    Gastroesophageal reflux disease without esophagitis       sennosides 8.6 MG tablet    SENOKOT     Take 1 tablet by mouth daily as needed for constipation        VIMPAT PO      Take 200 mg by mouth 2 times daily

## 2018-04-02 NOTE — PROGRESS NOTES
"  SUBJECTIVE:   Farooq Sorto is a 22 year old male who presents to clinic today for the following health issues:    Mother present at today's visit.    Rash.  Patient has been diagnosed with folliculitis by dermatology.  He received minocycline.  He had 3 seizures while on this medication, so it was discontinued.   He is now on topical medication.     Rectal bleeding.  He has rectal bleeding for 2 months.  Usually the stool is not hard and usually he is not constipated.   There is blood on the stool when he wipes.  Denies any abdominal pain.  The area itches.        Problem list and histories reviewed & adjusted, as indicated.    Reviewed and updated as needed this visit by clinical staff  Tobacco  Allergies  Meds  Problems  Med Hx  Surg Hx  Fam Hx  Soc Hx        Reviewed and updated as needed this visit by Provider         ROS:  CONSTITUTIONAL: NEGATIVE for fever, chills, change in weight  RESP: NEGATIVE for significant cough or SOB  CV: NEGATIVE for chest pain, palpitations or peripheral edema  GI: rectal bleeding  Neuro: h/o seizures    OBJECTIVE:     /90 (Cuff Size: Adult Large)  Pulse 64  Temp 98  F (36.7  C) (Oral)  Ht 5' 10.5\" (1.791 m)  Wt 269 lb 12.8 oz (122.4 kg)  SpO2 94%  BMI 38.17 kg/m2  Body mass index is 38.17 kg/(m^2).  GENERAL: healthy, alert and no distress  RESP: lungs clear to auscultation - no rales, rhonchi or wheezes  CV: regular rate and rhythm, normal S1 S2, no S3 or S4, no murmur, click or rub  : no external hemorrhoid appreciated    ASSESSMENT/PLAN:     (K62.5) Rectal bleeding  (primary encounter diagnosis)  Comment:   Plan: COLORECTAL SURGERY REFERRAL, hydrocortisone         (ANUSOL-HC) 2.5 % cream  -counseled on diet, exercise, and fluids to prevent constipation          Dermatitis.  Pt to follow up with dermatology.          Daina Waldron MD  [facility]    "

## 2018-04-16 ENCOUNTER — TRANSFERRED RECORDS (OUTPATIENT)
Dept: HEALTH INFORMATION MANAGEMENT | Facility: CLINIC | Age: 23
End: 2018-04-16

## 2018-04-17 ENCOUNTER — TRANSFERRED RECORDS (OUTPATIENT)
Dept: HEALTH INFORMATION MANAGEMENT | Facility: CLINIC | Age: 23
End: 2018-04-17

## 2018-04-27 ENCOUNTER — TRANSFERRED RECORDS (OUTPATIENT)
Dept: HEALTH INFORMATION MANAGEMENT | Facility: CLINIC | Age: 23
End: 2018-04-27

## 2018-06-23 ENCOUNTER — APPOINTMENT (OUTPATIENT)
Dept: GENERAL RADIOLOGY | Facility: CLINIC | Age: 23
End: 2018-06-23
Attending: EMERGENCY MEDICINE
Payer: COMMERCIAL

## 2018-06-23 ENCOUNTER — HOSPITAL ENCOUNTER (EMERGENCY)
Facility: CLINIC | Age: 23
Discharge: HOME | End: 2018-06-23
Attending: EMERGENCY MEDICINE | Admitting: EMERGENCY MEDICINE
Payer: COMMERCIAL

## 2018-06-23 VITALS
WEIGHT: 260 LBS | RESPIRATION RATE: 12 BRPM | TEMPERATURE: 98.8 F | OXYGEN SATURATION: 100 % | HEART RATE: 86 BPM | BODY MASS INDEX: 36.4 KG/M2 | HEIGHT: 71 IN | SYSTOLIC BLOOD PRESSURE: 134 MMHG | DIASTOLIC BLOOD PRESSURE: 81 MMHG

## 2018-06-23 DIAGNOSIS — R07.9 ACUTE CHEST PAIN: ICD-10-CM

## 2018-06-23 LAB
ANION GAP SERPL CALCULATED.3IONS-SCNC: 8 MMOL/L (ref 3–14)
BASOPHILS # BLD AUTO: 0 10E9/L (ref 0–0.2)
BASOPHILS NFR BLD AUTO: 0.2 %
BUN SERPL-MCNC: 8 MG/DL (ref 7–30)
CALCIUM SERPL-MCNC: 9.1 MG/DL (ref 8.5–10.1)
CHLORIDE SERPL-SCNC: 103 MMOL/L (ref 94–109)
CO2 SERPL-SCNC: 27 MMOL/L (ref 20–32)
CREAT SERPL-MCNC: 0.79 MG/DL (ref 0.66–1.25)
DIFFERENTIAL METHOD BLD: NORMAL
EOSINOPHIL # BLD AUTO: 0 10E9/L (ref 0–0.7)
EOSINOPHIL NFR BLD AUTO: 0.1 %
ERYTHROCYTE [DISTWIDTH] IN BLOOD BY AUTOMATED COUNT: 11.9 % (ref 10–15)
GFR SERPL CREATININE-BSD FRML MDRD: >90 ML/MIN/1.7M2
GLUCOSE SERPL-MCNC: 91 MG/DL (ref 70–99)
HCT VFR BLD AUTO: 46 % (ref 40–53)
HGB BLD-MCNC: 15.6 G/DL (ref 13.3–17.7)
IMM GRANULOCYTES # BLD: 0 10E9/L (ref 0–0.4)
IMM GRANULOCYTES NFR BLD: 0.4 %
LYMPHOCYTES # BLD AUTO: 2.7 10E9/L (ref 0.8–5.3)
LYMPHOCYTES NFR BLD AUTO: 27.8 %
MCH RBC QN AUTO: 30.5 PG (ref 26.5–33)
MCHC RBC AUTO-ENTMCNC: 33.9 G/DL (ref 31.5–36.5)
MCV RBC AUTO: 90 FL (ref 78–100)
MONOCYTES # BLD AUTO: 0.7 10E9/L (ref 0–1.3)
MONOCYTES NFR BLD AUTO: 7.2 %
NEUTROPHILS # BLD AUTO: 6.1 10E9/L (ref 1.6–8.3)
NEUTROPHILS NFR BLD AUTO: 64.3 %
NRBC # BLD AUTO: 0 10*3/UL
NRBC BLD AUTO-RTO: 0 /100
PLATELET # BLD AUTO: 356 10E9/L (ref 150–450)
POTASSIUM SERPL-SCNC: 3.8 MMOL/L (ref 3.4–5.3)
RBC # BLD AUTO: 5.11 10E12/L (ref 4.4–5.9)
SODIUM SERPL-SCNC: 138 MMOL/L (ref 133–144)
TROPONIN I SERPL-MCNC: <0.015 UG/L (ref 0–0.04)
WBC # BLD AUTO: 9.5 10E9/L (ref 4–11)

## 2018-06-23 PROCEDURE — 96374 THER/PROPH/DIAG INJ IV PUSH: CPT

## 2018-06-23 PROCEDURE — 93005 ELECTROCARDIOGRAM TRACING: CPT

## 2018-06-23 PROCEDURE — 99285 EMERGENCY DEPT VISIT HI MDM: CPT | Mod: 25

## 2018-06-23 PROCEDURE — 84484 ASSAY OF TROPONIN QUANT: CPT | Performed by: EMERGENCY MEDICINE

## 2018-06-23 PROCEDURE — 80048 BASIC METABOLIC PNL TOTAL CA: CPT | Performed by: EMERGENCY MEDICINE

## 2018-06-23 PROCEDURE — 71046 X-RAY EXAM CHEST 2 VIEWS: CPT

## 2018-06-23 PROCEDURE — 25000128 H RX IP 250 OP 636: Performed by: EMERGENCY MEDICINE

## 2018-06-23 PROCEDURE — 85025 COMPLETE CBC W/AUTO DIFF WBC: CPT | Performed by: EMERGENCY MEDICINE

## 2018-06-23 RX ORDER — KETOROLAC TROMETHAMINE 15 MG/ML
15 INJECTION, SOLUTION INTRAMUSCULAR; INTRAVENOUS ONCE
Status: COMPLETED | OUTPATIENT
Start: 2018-06-23 | End: 2018-06-23

## 2018-06-23 RX ADMIN — KETOROLAC TROMETHAMINE 15 MG: 15 INJECTION, SOLUTION INTRAMUSCULAR; INTRAVENOUS at 01:40

## 2018-06-23 ASSESSMENT — ENCOUNTER SYMPTOMS
SHORTNESS OF BREATH: 0
COUGH: 1
NAUSEA: 1

## 2018-06-23 NOTE — DISCHARGE INSTRUCTIONS
Discharge Instructions  Chest Pain    You have been seen today for chest pain or discomfort.  At this time, your doctor has found no signs that your chest pain is due to a serious or life-threatening condition, (or you have declined more testing and/or admission to the hospital). However, sometimes there is a serious problem that does not show up right away. Your evaluation today may not be complete and you may need further testing and evaluation. You need to follow-up with your regular doctor within 3 days.    Return to the Emergency Department if:      Your chest pain changes, gets worse, starts to happen more often, or comes with less activity.    You are short of breath.    You get very weak or tired.    You pass out or faint.    You have any new symptoms, like fever, cough, numb legs, or you cough up blood    You have anything else that worries you.    Until you follow-up with your regular doctor please do the following:      If you have questions, contact your regular doctor.    If your doctor today has told you to follow-up with your regular doctor, it is very important that you make an appointment with your clinic and go to the appointment.  If you do not follow-up with your primary doctor, it may result in missing an important development which could result in permanent injury or disability and/or lasting pain.  If there is any problem keeping your appointment, call your doctor or return to the Emergency Department.      Remember that you can always come back to the Emergency Department if you are not able to see your regular doctor in the amount of time listed above, if you get any new symptoms, or if there is anything that worries you.

## 2018-06-23 NOTE — ED TRIAGE NOTES
Pt to ER with c/o left side CP started 2-3 days ago , nauseated, denies any SOB pt also has rash all over body, pt states that it is from the shampoo at the gym

## 2018-06-23 NOTE — ED AVS SNAPSHOT
[facility] Emergency Department    [address]    [facility] [address]    Phone:  [phone]    Fax:  [phone]                                       Farooq Sorto   MRN: [id number]    Department:  [facility] Emergency Department   Date of Visit:  6/23/2018           After Visit Summary Signature Page     I have received my discharge instructions, and my questions have been answered. I have discussed any challenges I see with this plan with the nurse or doctor.    ..........................................................................................................................................  Patient/Patient Representative Signature      ..........................................................................................................................................  Patient Representative Print Name and Relationship to Patient    ..................................................               ................................................  Date                                            Time    ..........................................................................................................................................  Reviewed by Signature/Title    ...................................................              ..............................................  Date                                                            Time

## 2018-06-23 NOTE — ED PROVIDER NOTES
"  History     Chief Complaint:  Chest Pain    HPI   Farooq Sorto is a 23 year old male with a history of seizures who presents to the ED for evaluation of chest pain. The patient reports that he developed a left sided, sharp chest pain over the past 2-3 days. His pain has persisted, so he presented to the ED tonight for evaluation. He notes that his pain radiates to the center of his chest with standing, though radiates further to the left with laying. He has also had some associated nausea. There are no alleviating factors, though his pain worsens with coughing. He denies any shortness of breath. Of note, he does have a diffuse rash, though he notes this is from a new shampoo he is using.    Allergies:  Minocycline  Penicillins    Medications:    Symmetrel  Clonazepam  Guanfacine  Anusol  Balneol  Vimpat  Lamictal  Keppra  Zyprexa  Senokot  Zantac    Past Medical History:    ADHD  Anxiety  Depression  Down syndrome  Seizures  Chemical dependency  Suicidal ideation  Psychosis    Past Surgical History:    Back surgery  Head & Neck surgery  Soft tissue surgery    Family History:    Mother: seizure disorder  Father: diabetes  Brother: seizure disorder, asthma    Social History:  Marital Status:  Single [1]  Former Smoker  Negative for alcohol use.    Review of Systems   Respiratory: Positive for cough. Negative for shortness of breath.    Cardiovascular: Positive for chest pain.   Gastrointestinal: Positive for nausea.   Skin: Positive for rash.   All other systems reviewed and are negative.    Physical Exam     Patient Vitals for the past 24 hrs:   BP Temp Temp src Pulse Heart Rate Resp SpO2 Height Weight   06/23/18 0120 - - - - 86 - 100 % - -   06/23/18 0119 (!) 142/93 - - - 94 12 - - -   06/23/18 0026 (!) 147/93 98.8  F (37.1  C) Temporal 86 - 18 98 % 1.803 m (5' 11\") 117.9 kg (260 lb)     Physical Exam  Constitutional: Alert, attentive  HENT:    Nose: Nose normal.    Mouth/Throat: Oropharynx is clear, mucous " membranes are moist   Eyes: EOM are normal.   CV: regular rate and rhythm; no murmurs, rubs or gallups  Chest: Effort normal and breath sounds normal. +left peristernal chest wall tenderness  GI:  There is no tenderness. No distension. Normal bowel sounds  MSK: Normal range of motion.   Neurological: Alert, attentive  Skin: Skin is warm and dry.      Emergency Department Course   ECG:  Indication: Chest Pain  Time: 0023  Vent. Rate 80 bpm. VT interval 180. QRS duration 114. QT/QTc 388/447. P-R-T axis 50 57 19.  Normal sinus rhythm. Normal ECG. Read time: 0025    Imaging:  Radiographic findings were communicated with the patient who voiced understanding of the findings.  XR Chest 2 views:   No infiltrates or other acute findings. Normal-sized cardiac silhouette. No pneumothorax, as per radiology.     Laboratory:  CBC: WBC: 9.5, HGB: 15.6, PLT: 356  BMP: All WNL (Creatinine: 0.79)    0130 Troponin: <0.015    Interventions:  0140 Toradol, 15 mg, IV injection    Emergency Department Course:  Nursing notes and vitals reviewed. (0105) I performed an exam of the patient as documented above.     IV inserted. Medicine administered as documented above. Blood drawn. This was sent to the lab for further testing, results above.    The patient was sent for a Chest XR while in the emergency department, findings above.     EKG obtained in the ED, see results above.     (0235) I rechecked the patient and discussed the results of his workup thus far.     Findings and plan explained to the Patient. Patient discharged home with instructions regarding supportive care, medications, and reasons to return. The importance of close follow-up was reviewed.    I personally reviewed the laboratory results with the Patient and answered all related questions prior to discharge.     Impression & Plan      Medical Decision Making:  This is a 23-year-old male who presents with several days of atypical chest pain, reproducible on exam.  He is PERC  negative, essentially ruling out PE.  Screening EKG and troponin are negative, essentially ruling out atypical AMI, given long duration of symptoms.  Chest x-ray shows no widened mediastinum, pneumothorax, or pneumonia.  I emphasized the unclear nature of his symptoms and the importance of primary care follow-up.  I recommended a trial of ibuprofen, as it is reproducible on exam and may represent musculoskeletal pain.  He should return immediately for worse pain, shortness of breath, or any other concerns.    Diagnosis:    ICD-10-CM   1. Acute chest pain R07.9     Disposition:  discharged to home    Scribe Disclosure:  IChiquis, am serving as a scribe on 6/23/2018 at 12:59 AM to personally document services performed by Levi Duncan MD based on my observations and the provider's statements to me.     Chiquis Garcia  6/23/2018   [facility] EMERGENCY DEPARTMENT       Levi Duncan MD  06/23/18 0659

## 2018-06-24 LAB — INTERPRETATION ECG - MUSE: NORMAL

## 2018-07-06 ENCOUNTER — HOSPITAL ENCOUNTER (EMERGENCY)
Facility: CLINIC | Age: 23
Discharge: HOME | End: 2018-07-06
Attending: EMERGENCY MEDICINE | Admitting: EMERGENCY MEDICINE
Payer: COMMERCIAL

## 2018-07-06 VITALS
DIASTOLIC BLOOD PRESSURE: 93 MMHG | RESPIRATION RATE: 20 BRPM | TEMPERATURE: 98 F | SYSTOLIC BLOOD PRESSURE: 153 MMHG | OXYGEN SATURATION: 97 % | BODY MASS INDEX: 38.5 KG/M2 | HEIGHT: 71 IN | WEIGHT: 275 LBS | HEART RATE: 99 BPM

## 2018-07-06 DIAGNOSIS — L02.11 ABSCESS OF NECK: ICD-10-CM

## 2018-07-06 PROCEDURE — 10060 I&D ABSCESS SIMPLE/SINGLE: CPT

## 2018-07-06 PROCEDURE — 99283 EMERGENCY DEPT VISIT LOW MDM: CPT | Mod: 25

## 2018-07-06 PROCEDURE — 87070 CULTURE OTHR SPECIMN AEROBIC: CPT | Performed by: EMERGENCY MEDICINE

## 2018-07-06 RX ORDER — LIDOCAINE HYDROCHLORIDE AND EPINEPHRINE 10; 10 MG/ML; UG/ML
INJECTION, SOLUTION INFILTRATION; PERINEURAL
Status: DISCONTINUED
Start: 2018-07-06 | End: 2018-07-06 | Stop reason: HOSPADM

## 2018-07-06 RX ORDER — HYDROCODONE BITARTRATE AND ACETAMINOPHEN 5; 325 MG/1; MG/1
1 TABLET ORAL EVERY 6 HOURS PRN
Qty: 10 TABLET | Refills: 0 | Status: SHIPPED | OUTPATIENT
Start: 2018-07-06 | End: 2018-09-10

## 2018-07-06 RX ORDER — CEPHALEXIN 500 MG/1
500 CAPSULE ORAL 3 TIMES DAILY
Qty: 21 CAPSULE | Refills: 0 | Status: SHIPPED | OUTPATIENT
Start: 2018-07-06 | End: 2019-02-25

## 2018-07-06 ASSESSMENT — ENCOUNTER SYMPTOMS
FEVER: 0
WOUND: 1

## 2018-07-06 NOTE — ED TRIAGE NOTES
Pt has wound on back of neck for past 2 weeks and it is draining.  Neck pain is getting worse.  Pt sent from clinic.

## 2018-07-06 NOTE — ED AVS SNAPSHOT
[facility] Emergency Department    [address]    [facility] [address]    Phone:  [phone]    Fax:  [phone]                                       Farooq Sorto   MRN: [id number]    Department:  [facility] Emergency Department   Date of Visit:  7/6/2018           After Visit Summary Signature Page     I have received my discharge instructions, and my questions have been answered. I have discussed any challenges I see with this plan with the nurse or doctor.    ..........................................................................................................................................  Patient/Patient Representative Signature      ..........................................................................................................................................  Patient Representative Print Name and Relationship to Patient    ..................................................               ................................................  Date                                            Time    ..........................................................................................................................................  Reviewed by Signature/Title    ...................................................              ..............................................  Date                                                            Time

## 2018-07-06 NOTE — ED PROVIDER NOTES
"  History     Chief Complaint:  Wound Check      HPI   Farooq Sorto is a 23 year old male who presents for a wound check. The patient states that he noticed a cyst to his posterior neck two weeks ago. The area began draining today and has had increased pain at the site. He was seen in clinic and they attempted to express material from the cyst without results. The patient denies any fevers.       Allergies:  Minocycline  Penicillins     Medications:    Amantadine  Lamictal   Keppra  Q-PAP  Benzoyl Peroxide  Clonazepam  Guanfacine  Hydrocortisone  Ibuprofen   Lacosamide  Zyprexa  Zantac  Senokot     Past Medical History:    ADHD  Anxiety  Chemical dependency   Depressive Disorder  Down Syndrome  Seizures    Past Surgical History:    Back surgery   Head and Neck surgery   Soft tissue surgery     Family History:    Seizure Disorder-Mother, Brother  Diabetes-Father  Asthma-Brother    Social History:  Marital Status: Single  Presents to the ED alone  Tobacco Use: Former Smoker  Alcohol Use: No  PCP: Daina Waldron      Review of Systems   Constitutional: Negative for fever.   Skin: Positive for wound.   All other systems reviewed and are negative.    Physical Exam   First Vitals:  BP: (!) 153/93  Heart Rate: 118  Temp: 98  F (36.7  C)  Resp: 20  Height: 180.3 cm (5' 11\")  Weight: 124.7 kg (275 lb)  SpO2: 97 %      Physical Exam   Constitutional: He appears well-developed and well-nourished.   HENT:   Right Ear: External ear normal.   Left Ear: External ear normal.   Mouth/Throat: Oropharynx is clear and moist. No oropharyngeal exudate.   TM's clear bilaterally   Eyes: Conjunctivae are normal. Pupils are equal, round, and reactive to light. No scleral icterus.   Neck: Normal range of motion. Neck supple.       Moderate sized area of tenderness and fluctuence posterior midline neck, draining yellow purulent fluid on light pressure, 2 small holes seen   Cardiovascular: Normal rate, regular rhythm, normal heart " sounds and intact distal pulses.  Exam reveals no gallop and no friction rub.    No murmur heard.  Pulmonary/Chest: Effort normal and breath sounds normal. No respiratory distress. He has no wheezes. He has no rales.   Neurological: He is alert.   Skin: Skin is warm and dry. No rash noted.   Psychiatric: He has a normal mood and affect.     Emergency Department Course   Procedures:      Procedure: Incision and Drainage     LOCATIONS:  Posterior neck    ANESTHESIA:  Local field block using Lidocaine 1% with epinephrine    PREPARATION:  Cleansed with Betadine    PROCEDURE:  Area was incised with # 11 Blade ([place]) with a Single Straight incision.  Wound treatment included Purulent Drainage and Expression of Material.  With packing.  Appropriate dressing was applied to cover the area.    Patient Status: Patient tolerated the procedure well. There were no complications.      Labs:   Wound Culture Aerobic Bacterial: Pending    Emergency Department Course:  Nursing notes and vitals reviewed.  (1836) I performed an exam of the patient as documented above.    I performed a incision and drainage as documented above.   Findings and plan explained to the patient. Patient discharged home with instructions regarding supportive care, medications, and reasons to return. The importance of close follow-up was reviewed. The patient was prescribed Keflex and norco.      Impression & Plan    Medical Decision Making:  Farooq Sorto is a 23 year old male who presents with posterior neck pain.    He has signs of an abscess. I&D performed and successfully expressed purulent drainage; see above procedure note. No signs of serious infx like necrotizing fascitis or rapid cellulitis given fever curve, spread of erythema over past 24 hours, no crepitance to tissues, no sensation change to tissues.  Will need wound cares q day.  Plan home w/ f/u of surgery or primary; may return to ED for wound check if cannot arrange.  Abx given as  he has some erythema and concerns about cellulitis. Warning signs for wound given on discharge instructions and verbally; see d/c instructions.     Diagnosis:    ICD-10-CM    1. Abscess of neck L02.11 Wound Culture Aerobic Bacterial       Disposition:  discharged to home    Discharge Medications:  New Prescriptions    CEPHALEXIN (KEFLEX) 500 MG CAPSULE    Take 1 capsule (500 mg) by mouth 3 times daily for 7 days    HYDROCODONE-ACETAMINOPHEN (NORCO) 5-325 MG PER TABLET    Take 1 tablet by mouth every 6 hours as needed for severe pain         IHenrietta, am serving as a scribe on 7/6/2018 at 6:36 PM to personally document services performed by Dr. Weiss based on my observations and the provider's statements to me.    7/6/2018   [facility] EMERGENCY DEPARTMENT       Slick Weiss MD  07/06/18 4887

## 2018-07-06 NOTE — ED AVS SNAPSHOT
[facility] Emergency Department    [address]    [facility] [address]    Phone:  [phone]    Fax:  [phone]                                       Farooq Sorto   MRN: [id number]    Department:  [facility] Emergency Department   Date of Visit:  7/6/2018           Patient Information     Date Of Birth          1995        Your diagnoses for this visit were:     Abscess of neck        You were seen by Slick Weiss MD.      Follow-up Information     Follow up with Daina Waldron MD. Go in 3 days.    Specialty:  Internal Medicine    Why:  For wound re-check    Contact information:    [address] [facility] [address]  [phone]          Discharge Instructions         Abscess (Incision & Drainage)  An abscess is sometimes called a boil. It happens when bacteria get trapped under the skin and start to grow. Pus forms inside the abscess as the body responds to the bacteria. An abscess can happen with an insect bite, ingrown hair, blocked oil gland, pimple, cyst, or puncture wound.  Your healthcare provider has drained the pus from your abscess. If the abscess pocket was large, your healthcare provider may have put in gauze packing. Your provider will need to remove it on your next visit. He or she may also replace it at that time. You may not need antibiotics to treat a simple abscess, unless the infection is spreading into the skin around the wound (cellulitis).  The wound will take about 1 to 2 weeks to heal, depending on the size of the abscess. Healthy tissue will grow from the bottom and sides of the opening until it seals over.  Home care  These tips can help your wound heal:    The wound may drain for the first 2 days. Cover the wound with a clean dry dressing. Change the dressing if it becomes soaked with blood or pus.    If a gauze packing was placed inside the abscess pocket, you may be told to remove it yourself. You may do this in the  shower. Once the packing is removed, you should wash the area in the shower, or clean the area as directed by your provider. Continue to do this until the skin opening has closed. Make sure you wash your hands after changing the packing or cleaning the wound.    If you were prescribed antibiotics, take them as directed until they are all gone.    You may use acetaminophen or ibuprofen to control pain, unless another pain medicine was prescribed. If you have liver disease or ever had a stomach ulcer, talk with your doctor before using these medicines.  Follow-up care  Follow up with your healthcare provider, or as advised. If a gauze packing was put in your wound, it should be removed in 1 to 2 days. Check your wound every day for any signs that the infection is getting worse. The signs are listed below.  When to seek medical advice  Call your healthcare provider right away if any of these occur:    Increasing redness or swelling    Red streaks in the skin leading away from the wound    Increasing local pain or swelling    Continued pus draining from the wound 2 days after treatment    Fever of 100.4 F (38 C) or higher, or as directed by your healthcare provider    Boil returns when you are at home  Date Last Reviewed: 9/1/2016 2000-2017 [employer]. [address] [place], [place] [id number]. All rights reserved. This information is not intended as a substitute for professional medical care. Always follow your healthcare professional's instructions.      Opioid Medication Information    You have been given a prescription for an opioid (narcotic) pain medicine and/or have received a pain medicine while here in the Emergency Department. These medicines can make you drowsy or impaired. You must not drive, operate dangerous equipment, or engage in any other dangerous activities while taking these medications. If you drive while taking these medications, you could be arrested for DUI, or driving under the  influence. Do not drink any alcohol while you are taking these medications.   Opioid pain medications can cause addiction. If you have a history of chemical dependency of any type, you are at a higher risk of becoming addicted to pain medications.  Only take these prescribed medications to treat your pain when all other options have been tried. Take it for as short a time and as few doses as possible. Store your pain pills in a secure place, as they are frequently stolen and provide a dangerous opportunity for children or visitors in your house to start abusing these powerful medications. We will not replace any lost or stolen medicine.  As soon as your pain is better, you should flush all your remaining medication.   Many prescription pain medications contain Tylenol  (acetaminophen), including Vicodin , Tylenol #3 , Norco , Lortab , and Percocet .  You should not take any extra pills of Tylenol  if you are using these prescription medications or you can get very sick.  Do not ever take more than 4000 mg of acetaminophen in any 24 hour period.  All opioids tend to cause constipation. Drink plenty of water and eat foods that have a lot of fiber, such as fruits, vegetables, prune juice, apple juice and high fiber cereal.  Take a laxative if you don t move your bowels at least every other day. Miralax , Milk of Magnesia, Colace , or Senna  can be used to keep you regular.            24 Hour Appointment Hotline       To make an appointment at any [facility], call [phone] ([phone]). If you don't have a family doctor or clinic, we will help you find one. [place] clinics are conveniently located to serve the needs of you and your family.             Review of your medicines      START taking        Dose / Directions Last dose taken    cephALEXin 500 MG capsule   Commonly known as:  KEFLEX   Dose:  500 mg   Quantity:  21 capsule        Take 1 capsule (500 mg) by mouth 3 times daily for 7 days   Refills:   0        HYDROcodone-acetaminophen 5-325 MG per tablet   Commonly known as:  NORCO   Dose:  1 tablet   Quantity:  10 tablet        Take 1 tablet by mouth every 6 hours as needed for severe pain   Refills:  0          Our records show that you are taking the medicines listed below. If these are incorrect, please call your family doctor or clinic.        Dose / Directions Last dose taken    amantadine 100 MG capsule   Commonly known as:  SYMMETREL   Dose:  100 mg        Take 100 mg by mouth 2 times daily   Refills:  0        balneol Lotn lotion   Quantity:  90 mL        Apply to rectal area as needed for comfort   Refills:  0        benzoyl peroxide 10 % topical gel   Quantity:  28 g        Apply topically 2 times daily   Refills:  3        CLONAZEPAM PO   Dose:  0.5 mg        Take 0.5 mg by mouth   Refills:  0        GUANFACINE HCL PO   Dose:  2 mg        Take 2 mg by mouth 2 times daily   Refills:  0        hydrocortisone 2.5 % cream   Commonly known as:  ANUSOL-HC   Quantity:  30 g        Place rectally 2 times daily   Refills:  0        IBUPROFEN PO   Dose:  600 mg        Take 600 mg by mouth every 6 hours as needed for moderate pain   Refills:  0        lamoTRIgine 200 MG tablet   Commonly known as:  LaMICtal   Dose:  500 mg        Take 500 mg by mouth 2 times daily   Refills:  0        levETIRAcetam 500 MG tablet   Commonly known as:  KEPPRA   Dose:  500 mg        Take 500 mg by mouth 2 times daily   Refills:  0        OLANZapine 10 MG tablet   Commonly known as:  zyPREXA   Dose:  10 mg   Quantity:  90 tablet        Take 1 tablet (10 mg) by mouth At Bedtime   Refills:  0        Q- MG tablet   Dose:  325 mg   Generic drug:  acetaminophen        Take 325 mg by mouth every 4 hours as needed for mild pain or fever   Refills:  0        ranitidine 150 MG tablet   Commonly known as:  ZANTAC   Quantity:  180 tablet        TAKE 1 TABLET(150 MG) BY MOUTH TWICE DAILY   Refills:  2        sennosides 8.6 MG tablet    Commonly known as:  SENOKOT   Dose:  1 tablet        Take 1 tablet by mouth daily as needed for constipation   Refills:  0        VIMPAT PO   Dose:  200 mg        Take 200 mg by mouth 2 times daily   Refills:  0                Information about OPIOIDS     PRESCRIPTION OPIOIDS: WHAT YOU NEED TO KNOW   We gave you an opioid (narcotic) pain medicine. It is important to manage your pain, but opioids are not always the best choice. You should first try all the other options your care team gave you. Take this medicine for as short a time (and as few doses) as possible.     These medicines have risks:    DO NOT drive when on new or higher doses of pain medicine. These medicines can affect your alertness and reaction times, and you could be arrested for driving under the influence (DUI). If you need to use opioids long-term, talk to your care team about driving.    DO NOT operate heave machinery    DO NOT do any other dangerous activities while taking these medicines.     DO NOT drink any alcohol while taking these medicines.      If the opioid prescribed includes acetaminophen, DO NOT take with any other medicines that contain acetaminophen. Read all labels carefully. Look for the word  acetaminophen  or  Tylenol.  Ask your pharmacist if you have questions or are unsure.    You can get addicted to pain medicines, especially if you have a history of addiction (chemical, alcohol or substance dependence). Talk to your care team about ways to reduce this risk.    Store your pills in a secure place, locked if possible. We will not replace any lost or stolen medicine. If you don t finish your medicine, please throw away (dispose) as directed by your pharmacist. The [place] Pollution Control Agency has more information about safe disposal: https://www.pca.state.[place]/living-green/managing-unwanted-medications.     All opioids tend to cause constipation. Drink plenty of water and eat foods that have a lot of fiber, such as  fruits, vegetables, prune juice, apple juice and high-fiber cereal. Take a laxative (Miralax, milk of magnesia, Colace, Senna) if you don t move your bowels at least every other day.         Prescriptions were sent or printed at these locations (2 Prescriptions)                   Other Prescriptions                Printed at Department/Unit printer (2 of 2)         cephALEXin (KEFLEX) 500 MG capsule               HYDROcodone-acetaminophen (NORCO) 5-325 MG per tablet                Procedures and tests performed during your visit     Wound Culture Aerobic Bacterial      Orders Needing Specimen Collection     Ordered          07/06/18 1838  Wound Culture Aerobic Bacterial - STAT, [facility]: STAT, In process     Scheduled Task Status   07/06/18 1839 "[employer]" CC Reminder: Open   Order Class:  PCU Collect                  Pending Results     Date and Time Order Name Status Description    7/6/2018 1838 Wound Culture Aerobic Bacterial In process             Pending Culture Results     Date and Time Order Name Status Description    7/6/2018 1838 Wound Culture Aerobic Bacterial In process             Pending Results Instructions     If you had any lab results that were not finalized at the time of your Discharge, you can call the ED Lab Result RN at [phone]. You will be contacted by this team for any positive Lab results or changes in treatment. The nurses are available 7 days a week from 10A to 6:30P.  You can leave a message 24 hours per day and they will return your call.        Test Results From Your Hospital Stay        7/6/2018  7:02 PM                Clinical Quality Measure: Blood Pressure Screening     Your blood pressure was checked while you were in the emergency department today. The last reading we obtained was  BP: (!) 153/93 . Please read the guidelines below about what these numbers mean and what you should do about them.  If your systolic blood pressure (the top number) is less than 120 and your diastolic  "blood pressure (the bottom number) is less than 80, then your blood pressure is normal. There is nothing more that you need to do about it.  If your systolic blood pressure (the top number) is 120-139 or your diastolic blood pressure (the bottom number) is 80-89, your blood pressure may be higher than it should be. You should have your blood pressure rechecked within a year by a primary care provider.  If your systolic blood pressure (the top number) is 140 or greater or your diastolic blood pressure (the bottom number) is 90 or greater, you may have high blood pressure. High blood pressure is treatable, but if left untreated over time it can put you at risk for heart attack, stroke, or kidney failure. You should have your blood pressure rechecked by a primary care provider within the next 4 weeks.  If your provider in the emergency department today gave you specific instructions to follow-up with your doctor or provider even sooner than that, you should follow that instruction and not wait for up to 4 weeks for your follow-up visit.        Thank you for choosing [facility]       Thank you for choosing [facility] for your care. Our goal is always to provide you with excellent care. Hearing back from our patients is one way we can continue to improve our services. Please take a few minutes to complete the written survey that you may receive in the mail after you visit with us. Thank you!        [employer] Information     [employer] lets you send messages to your doctor, view your test results, renew your prescriptions, schedule appointments and more. To sign up, go to [email or web address] . Click on \"Log in\" on the left side of the screen, which will take you to the Welcome page. Then click on \"Sign up Now\" on the right side of the page.     You will be asked to enter the access code listed below, as well as some personal information. Please follow the directions to create your username and password.     Your access code " is: [email or web address]  Expires: 10/4/2018  7:08 PM     Your access code will  in 90 days. If you need help or a new code, please call your [facility] clinic or [phone].        Care EveryWhere ID     This is your Care EveryWhere ID. This could be used by other organizations to access your [facility] medical records  [id number]        Equal Access to Services     [facility] Hospital[facility] : Hadii gladis peraza hadasho Sogenevaali, waaxda luqadaha, qaybta kaalmada adeegyada, waxay sherri dodge . So [facility] [phone].    ATENCIÓN: Si habla español, tiene a eagle disposición servicios gratuitos de asistencia lingüística. Llame al [phone].    We comply with applicable federal civil rights laws and [place] laws. We do not discriminate on the basis of race, color, national origin, age, disability, sex, sexual orientation, or gender identity.            After Visit Summary       This is your record. Keep this with you and show to your community pharmacist(s) and doctor(s) at your next visit.

## 2018-07-07 NOTE — DISCHARGE INSTRUCTIONS
Abscess (Incision & Drainage)  An abscess is sometimes called a boil. It happens when bacteria get trapped under the skin and start to grow. Pus forms inside the abscess as the body responds to the bacteria. An abscess can happen with an insect bite, ingrown hair, blocked oil gland, pimple, cyst, or puncture wound.  Your healthcare provider has drained the pus from your abscess. If the abscess pocket was large, your healthcare provider may have put in gauze packing. Your provider will need to remove it on your next visit. He or she may also replace it at that time. You may not need antibiotics to treat a simple abscess, unless the infection is spreading into the skin around the wound (cellulitis).  The wound will take about 1 to 2 weeks to heal, depending on the size of the abscess. Healthy tissue will grow from the bottom and sides of the opening until it seals over.  Home care  These tips can help your wound heal:    The wound may drain for the first 2 days. Cover the wound with a clean dry dressing. Change the dressing if it becomes soaked with blood or pus.    If a gauze packing was placed inside the abscess pocket, you may be told to remove it yourself. You may do this in the shower. Once the packing is removed, you should wash the area in the shower, or clean the area as directed by your provider. Continue to do this until the skin opening has closed. Make sure you wash your hands after changing the packing or cleaning the wound.    If you were prescribed antibiotics, take them as directed until they are all gone.    You may use acetaminophen or ibuprofen to control pain, unless another pain medicine was prescribed. If you have liver disease or ever had a stomach ulcer, talk with your doctor before using these medicines.  Follow-up care  Follow up with your healthcare provider, or as advised. If a gauze packing was put in your wound, it should be removed in 1 to 2 days. Check your wound every day for any  signs that the infection is getting worse. The signs are listed below.  When to seek medical advice  Call your healthcare provider right away if any of these occur:    Increasing redness or swelling    Red streaks in the skin leading away from the wound    Increasing local pain or swelling    Continued pus draining from the wound 2 days after treatment    Fever of 100.4 F (38 C) or higher, or as directed by your healthcare provider    Boil returns when you are at home  Date Last Reviewed: 9/1/2016 2000-2017 [employer]. [address] [place] [id number]. All rights reserved. This information is not intended as a substitute for professional medical care. Always follow your healthcare professional's instructions.      Opioid Medication Information    You have been given a prescription for an opioid (narcotic) pain medicine and/or have received a pain medicine while here in the Emergency Department. These medicines can make you drowsy or impaired. You must not drive, operate dangerous equipment, or engage in any other dangerous activities while taking these medications. If you drive while taking these medications, you could be arrested for DUI, or driving under the influence. Do not drink any alcohol while you are taking these medications.   Opioid pain medications can cause addiction. If you have a history of chemical dependency of any type, you are at a higher risk of becoming addicted to pain medications.  Only take these prescribed medications to treat your pain when all other options have been tried. Take it for as short a time and as few doses as possible. Store your pain pills in a secure place, as they are frequently stolen and provide a dangerous opportunity for children or visitors in your house to start abusing these powerful medications. We will not replace any lost or stolen medicine.  As soon as your pain is better, you should flush all your remaining medication.   Many  prescription pain medications contain Tylenol  (acetaminophen), including Vicodin , Tylenol #3 , Norco , Lortab , and Percocet .  You should not take any extra pills of Tylenol  if you are using these prescription medications or you can get very sick.  Do not ever take more than 4000 mg of acetaminophen in any 24 hour period.  All opioids tend to cause constipation. Drink plenty of water and eat foods that have a lot of fiber, such as fruits, vegetables, prune juice, apple juice and high fiber cereal.  Take a laxative if you don t move your bowels at least every other day. Miralax , Milk of Magnesia, Colace , or Senna  can be used to keep you regular.

## 2018-07-08 LAB
BACTERIA SPEC CULT: NORMAL
Lab: NORMAL
SPECIMEN SOURCE: NORMAL

## 2018-08-01 ENCOUNTER — OFFICE VISIT (OUTPATIENT)
Dept: OPTOMETRY | Facility: CLINIC | Age: 23
End: 2018-08-01
Payer: COMMERCIAL

## 2018-08-01 ENCOUNTER — APPOINTMENT (OUTPATIENT)
Dept: OPTOMETRY | Facility: CLINIC | Age: 23
End: 2018-08-01
Payer: COMMERCIAL

## 2018-08-01 DIAGNOSIS — H52.203 ASTIGMATISM OF BOTH EYES, UNSPECIFIED TYPE: ICD-10-CM

## 2018-08-01 DIAGNOSIS — E11.9 DIABETES MELLITUS WITHOUT OPHTHALMIC MANIFESTATIONS (H): Primary | ICD-10-CM

## 2018-08-01 PROCEDURE — 92340 FIT SPECTACLES MONOFOCAL: CPT | Performed by: OPTOMETRIST

## 2018-08-01 PROCEDURE — 92004 COMPRE OPH EXAM NEW PT 1/>: CPT | Performed by: OPTOMETRIST

## 2018-08-01 ASSESSMENT — VISUAL ACUITY
OS_CC: 20/30
OS_SC: 20/30
OD_SC: 20/40
METHOD: SNELLEN - LINEAR
OD_CC+: -2
OD_SC+: +2
CORRECTION_TYPE: GLASSES
OS_CC: 20/20-2
OD_CC: 20/20-3
OS_SC+: -2
OD_CC: 20/25

## 2018-08-01 ASSESSMENT — REFRACTION_MANIFEST
OS_CYLINDER: +1.75
OS_AXIS: 082
OS_SPHERE: -1.50
OD_SPHERE: -1.75
OS_CYLINDER: +1.50
METHOD_AUTOREFRACTION: 1
OD_SPHERE: -1.25
OD_CYLINDER: +1.75
OS_SPHERE: -3.00
OD_AXIS: 082
OD_AXIS: 102
OD_CYLINDER: +2.25
OS_AXIS: 082

## 2018-08-01 ASSESSMENT — SLIT LAMP EXAM - LIDS
COMMENTS: NORMAL
COMMENTS: NORMAL

## 2018-08-01 ASSESSMENT — CUP TO DISC RATIO
OD_RATIO: 0.35
OS_RATIO: 0.4

## 2018-08-01 ASSESSMENT — REFRACTION_WEARINGRX
OD_SPHERE: -1.00
OS_AXIS: 076
OS_CYLINDER: +1.50
SPECS_TYPE: SVL
OS_SPHERE: -1.00
OD_CYLINDER: +1.00
OD_AXIS: 100

## 2018-08-01 ASSESSMENT — TONOMETRY
IOP_METHOD: APPLANATION
OS_IOP_MMHG: 19
OD_IOP_MMHG: 18

## 2018-08-01 ASSESSMENT — EXTERNAL EXAM - LEFT EYE: OS_EXAM: NORMAL

## 2018-08-01 ASSESSMENT — EXTERNAL EXAM - RIGHT EYE: OD_EXAM: NORMAL

## 2018-08-01 ASSESSMENT — CONF VISUAL FIELD
OD_NORMAL: 1
OS_NORMAL: 1

## 2018-08-01 NOTE — LETTER
8/1/2018         RE: Farooq Sorto  [address]   [place] [address]        Dear Colleague,    Thank you for referring your patient, Farooq Sorto, to the [facility]. Please see a copy of my visit note below.    Chief Complaint   Patient presents with     Diabetic Eye Exam       Lab Results   Component Value Date    A1C 6.2 10/09/2017    A1C 6.0 03/28/2016       Last Eye Exam: 2017  Dilated Previously: Yes    What are you currently using to see?  glasses    Distance Vision Acuity: Noticed gradual change in both eyes    Near Vision Acuity: Not satisfied     Eye Comfort: watery  Do you use eye drops? : No  Occupation or Hobbies: Disability     [employer].  8/1/2018     Medical, surgical and family histories reviewed and updated 8/1/2018.       OBJECTIVE: See Ophthalmology exam    ASSESSMENT:    ICD-10-CM    1. Diabetes mellitus without ophthalmic manifestations (H) E11.9    2. Astigmatism of both eyes, unspecified type H52.203       PLAN:  Optional prescription change  Farooq Sorto aware  eye exam results will be sent to Daina Waldron.    Beth Wells OD           Again, thank you for allowing me to participate in the care of your patient.        Sincerely,        Beth Wells, OD

## 2018-08-01 NOTE — PROGRESS NOTES
Chief Complaint   Patient presents with     Diabetic Eye Exam       Lab Results   Component Value Date    A1C 6.2 10/09/2017    A1C 6.0 03/28/2016       Last Eye Exam: 2017  Dilated Previously: Yes    What are you currently using to see?  glasses    Distance Vision Acuity: Noticed gradual change in both eyes    Near Vision Acuity: Not satisfied     Eye Comfort: watery  Do you use eye drops? : No  Occupation or Hobbies: Disability     [employer]  8/1/2018     Medical, surgical and family histories reviewed and updated 8/1/2018.       OBJECTIVE: See Ophthalmology exam    ASSESSMENT:    ICD-10-CM    1. Diabetes mellitus without ophthalmic manifestations (H) E11.9    2. Astigmatism of both eyes, unspecified type H52.203       PLAN:  Optional prescription change  Farooq Sorto aware  eye exam results will be sent to Daina Waldron.    Beth Wells OD

## 2018-08-02 NOTE — PATIENT INSTRUCTIONS
Diabetes weakens the blood vessels all over the body, including the eyes. Damage to the blood vessels in the eyes can cause swelling or bleeding into part of the eye (called the retina). This is called diabetic retinopathy (BAHMAN-tin-AH-pu-thee). If not treated, this disease can cause vision loss or blindness.   Symptoms may include blurred or distorted vision, but many people have no symptoms. It's important to see your eye doctor regularly to check for problems.   Early treatment and good control can help protect your vision. Here are the things you can do to help prevent vision loss:      1. Keep your blood sugar levels under tight control.      2. Bring high blood pressure under control.      3. No smoking.      4. Have yearly dilated eye exams.

## 2018-08-08 ENCOUNTER — TRANSFERRED RECORDS (OUTPATIENT)
Dept: HEALTH INFORMATION MANAGEMENT | Facility: CLINIC | Age: 23
End: 2018-08-08

## 2018-09-10 ENCOUNTER — OFFICE VISIT (OUTPATIENT)
Dept: INTERNAL MEDICINE | Facility: CLINIC | Age: 23
End: 2018-09-10
Payer: COMMERCIAL

## 2018-09-10 ENCOUNTER — TRANSFERRED RECORDS (OUTPATIENT)
Dept: HEALTH INFORMATION MANAGEMENT | Facility: CLINIC | Age: 23
End: 2018-09-10

## 2018-09-10 VITALS
TEMPERATURE: 97.9 F | SYSTOLIC BLOOD PRESSURE: 120 MMHG | BODY MASS INDEX: 40.6 KG/M2 | WEIGHT: 290 LBS | OXYGEN SATURATION: 97 % | HEART RATE: 93 BPM | DIASTOLIC BLOOD PRESSURE: 74 MMHG | HEIGHT: 71 IN

## 2018-09-10 DIAGNOSIS — G40.909 SEIZURE DISORDER (H): ICD-10-CM

## 2018-09-10 DIAGNOSIS — Z00.00 ENCOUNTER FOR ROUTINE ADULT HEALTH EXAMINATION WITHOUT ABNORMAL FINDINGS: Primary | ICD-10-CM

## 2018-09-10 DIAGNOSIS — E66.01 MORBID OBESITY (H): ICD-10-CM

## 2018-09-10 DIAGNOSIS — F32.0 MILD MAJOR DEPRESSION (H): ICD-10-CM

## 2018-09-10 PROBLEM — E11.9 DIABETES MELLITUS WITHOUT OPHTHALMIC MANIFESTATIONS (H): Status: RESOLVED | Noted: 2018-08-01 | Resolved: 2018-09-10

## 2018-09-10 LAB
BASOPHILS # BLD AUTO: 0 10E9/L (ref 0–0.2)
BASOPHILS NFR BLD AUTO: 0.7 %
DIFFERENTIAL METHOD BLD: NORMAL
EOSINOPHIL # BLD AUTO: 0 10E9/L (ref 0–0.7)
EOSINOPHIL NFR BLD AUTO: 0.5 %
ERYTHROCYTE [DISTWIDTH] IN BLOOD BY AUTOMATED COUNT: 12.4 % (ref 10–15)
HBA1C MFR BLD: 6.4 % (ref 0–5.6)
HCT VFR BLD AUTO: 47.9 % (ref 40–53)
HGB BLD-MCNC: 16.4 G/DL (ref 13.3–17.7)
LYMPHOCYTES # BLD AUTO: 2.3 10E9/L (ref 0.8–5.3)
LYMPHOCYTES NFR BLD AUTO: 38 %
MCH RBC QN AUTO: 30.8 PG (ref 26.5–33)
MCHC RBC AUTO-ENTMCNC: 34.2 G/DL (ref 31.5–36.5)
MCV RBC AUTO: 90 FL (ref 78–100)
MONOCYTES # BLD AUTO: 0.5 10E9/L (ref 0–1.3)
MONOCYTES NFR BLD AUTO: 8.8 %
NEUTROPHILS # BLD AUTO: 3.1 10E9/L (ref 1.6–8.3)
NEUTROPHILS NFR BLD AUTO: 52 %
PLATELET # BLD AUTO: 274 10E9/L (ref 150–450)
RBC # BLD AUTO: 5.32 10E12/L (ref 4.4–5.9)
WBC # BLD AUTO: 5.9 10E9/L (ref 4–11)

## 2018-09-10 PROCEDURE — 84443 ASSAY THYROID STIM HORMONE: CPT | Performed by: INTERNAL MEDICINE

## 2018-09-10 PROCEDURE — 80053 COMPREHEN METABOLIC PANEL: CPT | Performed by: INTERNAL MEDICINE

## 2018-09-10 PROCEDURE — 85025 COMPLETE CBC W/AUTO DIFF WBC: CPT | Performed by: INTERNAL MEDICINE

## 2018-09-10 PROCEDURE — 36415 COLL VENOUS BLD VENIPUNCTURE: CPT | Performed by: INTERNAL MEDICINE

## 2018-09-10 PROCEDURE — 99395 PREV VISIT EST AGE 18-39: CPT | Performed by: INTERNAL MEDICINE

## 2018-09-10 PROCEDURE — 83036 HEMOGLOBIN GLYCOSYLATED A1C: CPT | Performed by: INTERNAL MEDICINE

## 2018-09-10 RX ORDER — TRIAMCINOLONE ACETONIDE 1 MG/G
OINTMENT TOPICAL 2 TIMES DAILY
COMMUNITY

## 2018-09-10 RX ORDER — LACOSAMIDE 200 MG/1
TABLET ORAL 2 TIMES DAILY
COMMUNITY

## 2018-09-10 ASSESSMENT — PATIENT HEALTH QUESTIONNAIRE - PHQ9
SUM OF ALL RESPONSES TO PHQ QUESTIONS 1-9: 3
10. IF YOU CHECKED OFF ANY PROBLEMS, HOW DIFFICULT HAVE THESE PROBLEMS MADE IT FOR YOU TO DO YOUR WORK, TAKE CARE OF THINGS AT HOME, OR GET ALONG WITH OTHER PEOPLE: NOT DIFFICULT AT ALL
SUM OF ALL RESPONSES TO PHQ QUESTIONS 1-9: 3

## 2018-09-10 NOTE — PROGRESS NOTES
SUBJECTIVE:   CC: Farooq Sorto is an 23 year old male who presents for preventative health visit.     Physical   Annual:     Getting at least 3 servings of Calcium per day:  Yes    Bi-annual eye exam:  Yes    Dental care twice a year:  Yes    Sleep apnea or symptoms of sleep apnea:  Sleep apnea    Diet:  Regular (no restrictions)    Frequency of exercise:: not currently.    Duration of exercise:  N/A    Taking medications regularly:  Yes    Medication side effects:  None    Additional concerns today:  No        Today's PHQ-2 Score:   PHQ-2 ( 1999 [employer]) 9/10/2018   Q1: Little interest or pleasure in doing things 2   Q2: Feeling down, depressed or hopeless 1   PHQ-2 Score 3   Q1: Little interest or pleasure in doing things More than half the days   Q2: Feeling down, depressed or hopeless Several days   PHQ-2 Score 3       Abuse: Current or Past(Physical, Sexual or Emotional)- No  Do you feel safe in your environment - Yes    Social History   Substance Use Topics     Smoking status: Former Smoker     Years: 1.00     Quit date: 7/16/2016     Smokeless tobacco: Never Used      Comment: smoke when depress     Alcohol use No     Alcohol Use 9/10/2018   If you drink alcohol do you typically have greater than 3 drinks per day OR greater than 7 drinks per week? No     Last PSA: No results found for: PSA    Reviewed orders with patient. Reviewed health maintenance and updated orders accordingly - Yes  Labs reviewed in EPIC    Reviewed and updated as needed this visit by clinical staff         Reviewed and updated as needed this visit by Provider            Review of Systems  CONSTITUTIONAL: NEGATIVE for fever, chills, change in weight  INTEGUMENTARY/SKIN: NEGATIVE for worrisome rashes, moles or lesions  EYES: NEGATIVE for vision changes or irritation  ENT: NEGATIVE for ear, mouth and throat problems  RESP: NEGATIVE for significant cough or SOB  CV: NEGATIVE for chest pain, palpitations or peripheral edema  GI: NEGATIVE  "for nausea, abdominal pain, heartburn, or change in bowel habits   male: negative for dysuria, hematuria, decreased urinary stream, erectile dysfunction, urethral discharge  MUSCULOSKELETAL: NEGATIVE for significant arthralgias or myalgia  NEURO: NEGATIVE for weakness, dizziness or paresthesias  PSYCHIATRIC: NEGATIVE for changes in mood or affect    OBJECTIVE:   There were no vitals taken for this visit.   /74  Pulse 93  Temp 97.9  F (36.6  C) (Oral)  Ht 5' 11\" (1.803 m)  Wt 290 lb (131.5 kg)  SpO2 97%  BMI 40.45 kg/m2      Physical Exam  GENERAL: healthy, alert and no distress  EYES: Eyes grossly normal to inspection, PERRL and conjunctivae and sclerae normal  HENT: ear canals and TM's normal, nose and mouth without ulcers or lesions  NECK: no adenopathy, no asymmetry, masses, or scars and thyroid normal to palpation  RESP: lungs clear to auscultation - no rales, rhonchi or wheezes  CV: regular rate and rhythm, normal S1 S2, no S3 or S4, no murmur, click or rub, no peripheral edema and peripheral pulses strong  ABDOMEN: soft, nontender, no hepatosplenomegaly, no masses and bowel sounds normal  MS: no gross musculoskeletal defects noted, no edema  : no hernia appreciated; no testicular lumps or tenderness appreciated  SKIN: no suspicious lesions or rashes  NEURO: Normal strength and tone, mentation intact and speech normal  PSYCH: mentation appears normal, affect normal/bright      ASSESSMENT/PLAN:         (Z00.00) Encounter for routine adult health examination without abnormal findings  (primary encounter diagnosis)  Comment: not fasting today  Plan: Hemoglobin A1c, CBC with platelets         differential, Comprehensive metabolic panel,         TSH with free T4 reflex, NEISSERIA GONORRHOEA         PCR, CHLAMYDIA TRACHOMATIS PCR            (E66.01) Morbid obesity (H)  Comment:   Plan: BARIATRIC ADULT REFERRAL, Hemoglobin A1c            (F32.0) Mild major depression (H)  Comment:   Plan: followed by " "psych    (G40.9) Seizure disorder (H)  Comment: approx 4-5 months without seizures  Plan: followed with neurology                  COUNSELING:   Reviewed preventive health counseling, as reflected in patient instructions    BP Readings from Last 1 Encounters:   07/06/18 (!) 153/93     Estimated body mass index is 38.35 kg/(m^2) as calculated from the following:    Height as of 7/6/18: 5' 11\" (1.803 m).    Weight as of 7/6/18: 275 lb (124.7 kg).           reports that he quit smoking about 2 years ago. He quit after 1.00 year of use. He has never used smokeless tobacco.      Counseling Resources:  ATP IV Guidelines  Pooled Cohorts Equation Calculator  FRAX Risk Assessment  ICSI Preventive Guidelines  Dietary Guidelines for Americans, 2010  USDA's MyPlate  ASA Prophylaxis  Lung CA Screening    Daina Waldron MD  [facility]  Answers for HPI/ROS submitted by the patient on 9/10/2018   PHQ-2 Score: 3  If you checked off any problems, how difficult have these problems made it for you to do your work, take care of things at home, or get along with other people?: Not difficult at all  PHQ9 TOTAL SCORE: 3    "

## 2018-09-10 NOTE — MR AVS SNAPSHOT
After Visit Summary   9/10/2018    Farooq Sorto    MRN: [id number]           Patient Information     Date Of Birth          1995        Visit Information        Provider Department      9/10/2018 2:40 PM Daina Waldron MD [facility]        Today's Diagnoses     Encounter for routine adult health examination without abnormal findings    -  1    Morbid obesity (H)        Mild major depression (H)        Seizure disorder (H)          Care Instructions    Replace soda with water    Increase vegetables     Preventive Health Recommendations  Male Ages 21 - 25     Yearly exam:             See your health care provider every year in order to  o   Review health changes.   o   Discuss preventive care.    o   Review your medicines if your doctor has prescribed any.    You should be tested each year for STDs (sexually transmitted diseases).     Talk to your provider about cholesterol testing.      If you are at risk for diabetes, you should have a diabetes test (fasting glucose).    Shots: Get a flu shot each year. Get a tetanus shot every 10 years.     Nutrition:    Eat at least 5 servings of fruits and vegetables daily.     Eat whole-grain bread, whole-wheat pasta and brown rice instead of white grains and rice.     Get adequate calcium and Vitamin D.     Lifestyle    Exercise for at least 150 minutes a week (30 minutes a day, 5 days a week). This will help you control your weight and prevent disease.     Limit alcohol to one drink per day.     No smoking.     Wear sunscreen to prevent skin cancer.     See your dentist every six months for an exam and cleaning.             Follow-ups after your visit        Additional Services     BARIATRIC ADULT REFERRAL       Your provider has referred you to: [facility]: [facility]  [place] [phone]   [email or web address]    Please be aware that coverage of these services is subject to the terms and limitations of your health insurance plan.  Call member services at your health plan with any benefit or coverage questions.      Please bring the following with you to your appointment:      (1) List of current medications   (2) This referral request   (3) Any documents/labs given to you for this referral                  Who to contact     If you have questions or need follow up information about today's clinic visit or your schedule please contact [facility] directly at [phone].  Normal or non-critical lab and imaging results will be communicated to you by MyChart, letter or phone within 4 business days after the clinic has received the results. If you do not hear from us within 7 days, please contact the clinic through MyChart or phone. If you have a critical or abnormal lab result, we will notify you by phone as soon as possible.  Submit refill requests through [employer] or call your pharmacy and they will forward the refill request to us. Please allow 3 business days for your refill to be completed.          Additional Information About Your Visit        Care EveryWhere ID     This is your Care EveryWhere ID. This could be used by other organizations to access your [place] medical records  [id number]        Your Vitals Were     Pulse Temperature Height Pulse Oximetry BMI (Body Mass Index)       93 97.9  F (36.6  C) (Oral) 5' 11\" (1.803 m) 97% 40.45 kg/m2        Blood Pressure from Last 3 Encounters:   09/10/18 120/74   07/06/18 (!) 153/93   06/23/18 134/81    Weight from Last 3 Encounters:   09/10/18 290 lb (131.5 kg)   07/06/18 275 lb (124.7 kg)   06/23/18 260 lb (117.9 kg)              We Performed the Following     BARIATRIC ADULT REFERRAL     CBC with platelets differential     CHLAMYDIA " TRACHOMATIS PCR     Comprehensive metabolic panel     Hemoglobin A1c     NEISSERIA GONORRHOEA PCR     TSH with free T4 reflex          Today's Medication Changes          These changes are accurate as of 9/10/18  3:38 PM.  If you have any questions, ask your nurse or doctor.               These medicines have changed or have updated prescriptions.        Dose/Directions    VIMPAT 200 MG Tabs tablet   This may have changed:  Another medication with the same name was removed. Continue taking this medication, and follow the directions you see here.   Generic drug:  lacosamide   Changed by:  Daina Waldron MD        Take by mouth 2 times daily   Refills:  0         Stop taking these medicines if you haven't already. Please contact your care team if you have questions.     HYDROcodone-acetaminophen 5-325 MG per tablet   Commonly known as:  NORCO   Stopped by:  Daina Waldron MD                    Primary Care Provider Office Phone # Fax #    Daina Waldron [phone]       [address] [facility] [address]        Equal Access to Services     [facility] Medicine[facility]AB[facility] : Hadii aad ku hadasho Soomaali, waaxda luqadaha, qaybta kaalmada adeegyada, waxay idiin hayaan walt dodge . So [facility] [phone].    ATENCIÓN: Si habla español, tiene a eagle disposición servicios gratuitos de asistencia lingüística. Llame al [phone].    We comply with applicable federal civil rights laws and [place] laws. We do not discriminate on the basis of race, color, national origin, age, disability, sex, sexual orientation, or gender identity.            Thank you!     Thank you for choosing [facility]  for your care. Our goal is always to provide you with excellent care. Hearing back from our patients is one way we can continue to improve our services. Please take a few minutes to complete the written survey that you may receive in the mail after your visit with us. Thank you!              Your Updated Medication List - Protect others around you: Learn how to safely use, store and throw away your medicines at www.disposemymeds.org.          This list is accurate as of 9/10/18  3:38 PM.  Always use your most recent med list.                   Brand Name Dispense Instructions for use Diagnosis    amantadine 100 MG capsule    SYMMETREL     Take 100 mg by mouth 2 times daily        balneol Lotn lotion     90 mL    Apply to rectal area as needed for comfort    Rectal irritation       benzoyl peroxide 10 % topical gel     28 g    Apply topically 2 times daily    Acne vulgaris       CLONAZEPAM PO      Take 0.5 mg by mouth        GUANFACINE HCL PO      Take 2 mg by mouth 2 times daily        hydrocortisone 2.5 % cream    ANUSOL-HC    30 g    Place rectally 2 times daily    Rectal bleeding       IBUPROFEN PO      Take 600 mg by mouth every 6 hours as needed for moderate pain        lamoTRIgine 200 MG tablet    LaMICtal     Take 500 mg by mouth 2 times daily        levETIRAcetam 500 MG tablet    KEPPRA     Take 500 mg by mouth 2 times daily        OLANZapine 10 MG tablet    zyPREXA    90 tablet    Take 1 tablet (10 mg) by mouth At Bedtime    Aggressive behavior, Agitation       Q- MG tablet   Generic drug:  acetaminophen      Take 325 mg by mouth every 4 hours as needed for mild pain or fever        ranitidine 150 MG tablet    ZANTAC    180 tablet    TAKE 1 TABLET(150 MG) BY MOUTH TWICE DAILY    Gastroesophageal reflux disease without esophagitis       triamcinolone 0.1 % ointment    KENALOG     Apply topically 2 times daily        VIMPAT 200 MG Tabs tablet   Generic drug:  lacosamide      Take by mouth 2 times daily

## 2018-09-10 NOTE — LETTER
September 13, 2018      Farooq Sorto  [address]   [place] [address]        Dear ,    We are writing to inform you of your test results.    The cholesterol level LDL is at goal.   The kidney function (GFR) and liver function tests (AST and ALT) are within normal limits.     The fasting glucose reveals pre-diabetes.   Recommendations are for a healthy weight (BMI of 20-25), healthy diet (please let us know if you would like to see a nutritionist), and exercise (150 minutes per week) to prevent diabetes.   The thyroid test (TSH) is within normal limits.   The blood counts (CBC) are within normal limits.     Resulted Orders   Hemoglobin A1c   Result Value Ref Range    Hemoglobin A1C 6.4 (H) 0 - 5.6 %      Comment:      Normal <5.7% Prediabetes 5.7-6.4%  Diabetes 6.5% or higher - adopted from ADA   consensus guidelines.     CBC with platelets differential   Result Value Ref Range    WBC 5.9 4.0 - 11.0 10e9/L    RBC Count 5.32 4.4 - 5.9 10e12/L    Hemoglobin 16.4 13.3 - 17.7 g/dL    Hematocrit 47.9 40.0 - 53.0 %    MCV 90 78 - 100 fl    MCH 30.8 26.5 - 33.0 pg    MCHC 34.2 31.5 - 36.5 g/dL    RDW 12.4 10.0 - 15.0 %    Platelet Count 274 150 - 450 10e9/L    % Neutrophils 52.0 %    % Lymphocytes 38.0 %    % Monocytes 8.8 %    % Eosinophils 0.5 %    % Basophils 0.7 %    Absolute Neutrophil 3.1 1.6 - 8.3 10e9/L    Absolute Lymphocytes 2.3 0.8 - 5.3 10e9/L    Absolute Monocytes 0.5 0.0 - 1.3 10e9/L    Absolute Eosinophils 0.0 0.0 - 0.7 10e9/L    Absolute Basophils 0.0 0.0 - 0.2 10e9/L    Diff Method Automated Method    Comprehensive metabolic panel   Result Value Ref Range    Sodium 142 133 - 144 mmol/L    Potassium 4.0 3.4 - 5.3 mmol/L    Chloride 107 94 - 109 mmol/L    Carbon Dioxide 27 20 - 32 mmol/L    Anion Gap 8 3 - 14 mmol/L    Glucose 97 70 - 99 mg/dL      Comment:      Non Fasting    Urea Nitrogen 13 7 - 30 mg/dL    Creatinine 0.97 0.66 - 1.25 mg/dL    GFR Estimate >90 >60 mL/min/1.7m2       Comment:      Non  GFR Calc    GFR Estimate If Black >90 >60 mL/min/1.7m2      Comment:       GFR Calc    Calcium 9.4 8.5 - 10.1 mg/dL    Bilirubin Total 0.6 0.2 - 1.3 mg/dL    Albumin 4.1 3.4 - 5.0 g/dL    Protein Total 8.7 6.8 - 8.8 g/dL    Alkaline Phosphatase 151 (H) 40 - 150 U/L    ALT 56 0 - 70 U/L    AST 25 0 - 45 U/L   TSH with free T4 reflex   Result Value Ref Range    TSH 2.99 0.40 - 4.00 mU/L       If you have any questions or concerns, please call the clinic at the number listed above.       Sincerely,        Daina Waldron MD

## 2018-09-10 NOTE — NURSING NOTE
"/74  Pulse 93  Temp 97.9  F (36.6  C) (Oral)  Ht 5' 11\" (1.803 m)  Wt 290 lb (131.5 kg)  SpO2 97%  BMI 40.45 kg/m2    "

## 2018-09-10 NOTE — PATIENT INSTRUCTIONS
Replace soda with water    Increase vegetables     Preventive Health Recommendations  Male Ages 21 - 25     Yearly exam:             See your health care provider every year in order to  o   Review health changes.   o   Discuss preventive care.    o   Review your medicines if your doctor has prescribed any.    You should be tested each year for STDs (sexually transmitted diseases).     Talk to your provider about cholesterol testing.      If you are at risk for diabetes, you should have a diabetes test (fasting glucose).    Shots: Get a flu shot each year. Get a tetanus shot every 10 years.     Nutrition:    Eat at least 5 servings of fruits and vegetables daily.     Eat whole-grain bread, whole-wheat pasta and brown rice instead of white grains and rice.     Get adequate calcium and Vitamin D.     Lifestyle    Exercise for at least 150 minutes a week (30 minutes a day, 5 days a week). This will help you control your weight and prevent disease.     Limit alcohol to one drink per day.     No smoking.     Wear sunscreen to prevent skin cancer.     See your dentist every six months for an exam and cleaning.

## 2018-09-11 LAB
ALBUMIN SERPL-MCNC: 4.1 G/DL (ref 3.4–5)
ALP SERPL-CCNC: 151 U/L (ref 40–150)
ALT SERPL W P-5'-P-CCNC: 56 U/L (ref 0–70)
ANION GAP SERPL CALCULATED.3IONS-SCNC: 8 MMOL/L (ref 3–14)
AST SERPL W P-5'-P-CCNC: 25 U/L (ref 0–45)
BILIRUB SERPL-MCNC: 0.6 MG/DL (ref 0.2–1.3)
BUN SERPL-MCNC: 13 MG/DL (ref 7–30)
CALCIUM SERPL-MCNC: 9.4 MG/DL (ref 8.5–10.1)
CHLORIDE SERPL-SCNC: 107 MMOL/L (ref 94–109)
CO2 SERPL-SCNC: 27 MMOL/L (ref 20–32)
CREAT SERPL-MCNC: 0.97 MG/DL (ref 0.66–1.25)
GFR SERPL CREATININE-BSD FRML MDRD: >90 ML/MIN/1.7M2
GLUCOSE SERPL-MCNC: 97 MG/DL (ref 70–99)
POTASSIUM SERPL-SCNC: 4 MMOL/L (ref 3.4–5.3)
PROT SERPL-MCNC: 8.7 G/DL (ref 6.8–8.8)
SODIUM SERPL-SCNC: 142 MMOL/L (ref 133–144)
TSH SERPL DL<=0.005 MIU/L-ACNC: 2.99 MU/L (ref 0.4–4)

## 2018-09-11 ASSESSMENT — PATIENT HEALTH QUESTIONNAIRE - PHQ9: SUM OF ALL RESPONSES TO PHQ QUESTIONS 1-9: 3

## 2018-09-12 DIAGNOSIS — Z00.00 ENCOUNTER FOR ROUTINE ADULT HEALTH EXAMINATION WITHOUT ABNORMAL FINDINGS: ICD-10-CM

## 2018-09-12 PROCEDURE — 87491 CHLMYD TRACH DNA AMP PROBE: CPT | Performed by: INTERNAL MEDICINE

## 2018-09-12 PROCEDURE — 87591 N.GONORRHOEAE DNA AMP PROB: CPT | Performed by: INTERNAL MEDICINE

## 2018-09-13 LAB
C TRACH DNA SPEC QL NAA+PROBE: NEGATIVE
N GONORRHOEA DNA SPEC QL NAA+PROBE: NEGATIVE
SPECIMEN SOURCE: NORMAL
SPECIMEN SOURCE: NORMAL

## 2018-09-26 ENCOUNTER — TRANSFERRED RECORDS (OUTPATIENT)
Dept: HEALTH INFORMATION MANAGEMENT | Facility: CLINIC | Age: 23
End: 2018-09-26

## 2018-10-11 ENCOUNTER — TRANSFERRED RECORDS (OUTPATIENT)
Dept: HEALTH INFORMATION MANAGEMENT | Facility: CLINIC | Age: 23
End: 2018-10-11

## 2018-10-24 ENCOUNTER — OFFICE VISIT (OUTPATIENT)
Dept: INTERNAL MEDICINE | Facility: CLINIC | Age: 23
End: 2018-10-24
Payer: COMMERCIAL

## 2018-10-24 VITALS
WEIGHT: 288.2 LBS | DIASTOLIC BLOOD PRESSURE: 90 MMHG | SYSTOLIC BLOOD PRESSURE: 122 MMHG | BODY MASS INDEX: 40.2 KG/M2 | OXYGEN SATURATION: 96 % | TEMPERATURE: 97.4 F | RESPIRATION RATE: 16 BRPM | HEART RATE: 87 BPM

## 2018-10-24 DIAGNOSIS — Z23 NEED FOR PROPHYLACTIC VACCINATION AND INOCULATION AGAINST INFLUENZA: ICD-10-CM

## 2018-10-24 DIAGNOSIS — F32.0 MILD MAJOR DEPRESSION (H): ICD-10-CM

## 2018-10-24 DIAGNOSIS — H61.20 IMPACTED CERUMEN, UNSPECIFIED LATERALITY: Primary | ICD-10-CM

## 2018-10-24 DIAGNOSIS — F41.1 GENERALIZED ANXIETY DISORDER: ICD-10-CM

## 2018-10-24 PROCEDURE — 90471 IMMUNIZATION ADMIN: CPT | Performed by: INTERNAL MEDICINE

## 2018-10-24 PROCEDURE — 90686 IIV4 VACC NO PRSV 0.5 ML IM: CPT | Performed by: INTERNAL MEDICINE

## 2018-10-24 PROCEDURE — 99214 OFFICE O/P EST MOD 30 MIN: CPT | Mod: 25 | Performed by: INTERNAL MEDICINE

## 2018-10-24 RX ORDER — DOXYCYCLINE HYCLATE 100 MG
100 TABLET ORAL 2 TIMES DAILY
COMMUNITY
End: 2018-10-24

## 2018-10-24 RX ORDER — DOXYCYCLINE 100 MG/1
100 CAPSULE ORAL 2 TIMES DAILY
Refills: 2 | COMMUNITY
Start: 2018-10-11 | End: 2018-10-24

## 2018-10-24 ASSESSMENT — ANXIETY QUESTIONNAIRES
1. FEELING NERVOUS, ANXIOUS, OR ON EDGE: SEVERAL DAYS
GAD7 TOTAL SCORE: 16
7. FEELING AFRAID AS IF SOMETHING AWFUL MIGHT HAPPEN: MORE THAN HALF THE DAYS
IF YOU CHECKED OFF ANY PROBLEMS ON THIS QUESTIONNAIRE, HOW DIFFICULT HAVE THESE PROBLEMS MADE IT FOR YOU TO DO YOUR WORK, TAKE CARE OF THINGS AT HOME, OR GET ALONG WITH OTHER PEOPLE: SOMEWHAT DIFFICULT
2. NOT BEING ABLE TO STOP OR CONTROL WORRYING: MORE THAN HALF THE DAYS
5. BEING SO RESTLESS THAT IT IS HARD TO SIT STILL: NEARLY EVERY DAY
6. BECOMING EASILY ANNOYED OR IRRITABLE: NEARLY EVERY DAY
3. WORRYING TOO MUCH ABOUT DIFFERENT THINGS: MORE THAN HALF THE DAYS

## 2018-10-24 ASSESSMENT — PATIENT HEALTH QUESTIONNAIRE - PHQ9
5. POOR APPETITE OR OVEREATING: NEARLY EVERY DAY
SUM OF ALL RESPONSES TO PHQ QUESTIONS 1-9: 6

## 2018-10-24 NOTE — MR AVS SNAPSHOT
After Visit Summary   10/24/2018    Farooq Sorto    MRN: [id number]           Patient Information     Date Of Birth          1995        Visit Information        Provider Department      10/24/2018 2:20 PM Daina Waldron MD [facility]        Today's Diagnoses     Impacted cerumen, unspecified laterality    -  1    Mild major depression (H)        Generalized anxiety disorder           Follow-ups after your visit        Additional Services     MENTAL HEALTH REFERRAL  - Adult; Outpatient Treatment; Individual/Couples/Family/Group Therapy/Health Psychology; G: [facility] [phone]; We will contact you to schedule the appointment or please call with any questions       All scheduling is subject to the client's specific insurance plan & benefits, provider/location availability, and provider clinical specialities.  Please arrive 15 minutes early for your first appointment and bring your completed paperwork.    Please be aware that coverage of these services is subject to the terms and limitations of your health insurance plan.  Call member services at your health plan with any benefit or coverage questions.                            Follow-up notes from your care team     Return in about 6 months (around 4/24/2019) for Routine Visit.      Who to contact     If you have questions or need follow up information about today's clinic visit or your schedule please contact [facility] directly at [phone].  Normal or non-critical lab and imaging results will be communicated to you by MyChart, letter or phone within 4 business days after the clinic has received the results. If you do not hear from us within 7 days, please contact the clinic through MyChart or phone. If you have a critical or abnormal lab result, we will notify you by phone as soon as possible.  Submit refill requests through [employer] or call your pharmacy and they will  forward the refill request to us. Please allow 3 business days for your refill to be completed.          Additional Information About Your Visit        Care EveryWhere ID     This is your Care EveryWhere ID. This could be used by other organizations to access your [place] medical records  [id number]        Your Vitals Were     Pulse Temperature Respirations Pulse Oximetry BMI (Body Mass Index)       87 97.4  F (36.3  C) (Oral) 16 96% 40.2 kg/m2        Blood Pressure from Last 3 Encounters:   10/24/18 122/90   09/10/18 120/74   07/06/18 (!) 153/93    Weight from Last 3 Encounters:   10/24/18 288 lb 3.2 oz (130.7 kg)   09/10/18 290 lb (131.5 kg)   07/06/18 275 lb (124.7 kg)              We Performed the Following     MENTAL HEALTH REFERRAL  - Adult; Outpatient Treatment; Individual/Couples/Family/Group Therapy/Health Psychology; G: [facility] [phone]; We will contact you to schedule the appointment or please call with any questions          Today's Medication Changes          These changes are accurate as of 10/24/18  3:10 PM.  If you have any questions, ask your nurse or doctor.               Start taking these medicines.        Dose/Directions    carbamide peroxide 6.5 % otic solution   Commonly known as:  DEBROX   Used for:  Impacted cerumen, unspecified laterality   Started by:  Daina Waldron MD        Dose:  5-10 drop   Place 5-10 drops into both ears 2 times daily as needed for other (wax)   Quantity:  30 mL   Refills:  0            Where to get your medicines      These medications were sent to [id number] - [place] - [address] AT [facility]  [address], [place] [address]     Phone:  [phone]     carbamide peroxide 6.5 % otic solution                Primary Care Provider Office Phone # Fax #    Daina Waldron [phone]       [address]  [facility] [address]        Equal Access to Services     [facility]  GAAR : Hadii aad ku hadjareno Sogenevaali, waaxda luqadaha, qaybta kaalmada adeegyada, janny idiin hayaaburt snyderamenamauricio dodge . So [facility] [phone].    ATENCIÓN: Si habla español, tiene a eagle disposición servicios gratuitos de asistencia lingüística. Llame al [phone].    We comply with applicable federal civil rights laws and [place] laws. We do not discriminate on the basis of race, color, national origin, age, disability, sex, sexual orientation, or gender identity.            Thank you!     Thank you for choosing [facility]  for your care. Our goal is always to provide you with excellent care. Hearing back from our patients is one way we can continue to improve our services. Please take a few minutes to complete the written survey that you may receive in the mail after your visit with us. Thank you!             Your Updated Medication List - Protect others around you: Learn how to safely use, store and throw away your medicines at www.disposemymeds.org.          This list is accurate as of 10/24/18  3:10 PM.  Always use your most recent med list.                   Brand Name Dispense Instructions for use Diagnosis    amantadine 100 MG capsule    SYMMETREL     Take 100 mg by mouth 2 times daily        benzoyl peroxide 10 % topical gel     28 g    Apply topically 2 times daily    Acne vulgaris       carbamide peroxide 6.5 % otic solution    DEBROX    30 mL    Place 5-10 drops into both ears 2 times daily as needed for other (wax)    Impacted cerumen, unspecified laterality       CLONAZEPAM PO      Take 0.5 mg by mouth        DOXYCYCLINE HYCLATE PO      Take 100 mg by mouth 1 tab twice daily        GUANFACINE HCL PO      Take 2 mg by mouth 2 times daily        hydrocortisone 2.5 % cream    ANUSOL-HC    30 g    Place rectally 2 times daily    Rectal bleeding       IBUPROFEN PO      Take 600 mg by mouth every 6 hours as needed for moderate pain        lamoTRIgine 200 MG tablet    LaMICtal      Take 500 mg by mouth 2 times daily        levETIRAcetam 500 MG tablet    KEPPRA     Take 500 mg by mouth 2 times daily        OLANZapine 10 MG tablet    zyPREXA    90 tablet    Take 1 tablet (10 mg) by mouth At Bedtime    Aggressive behavior, Agitation       Q- MG tablet   Generic drug:  acetaminophen      Take 325 mg by mouth every 4 hours as needed for mild pain or fever        ranitidine 150 MG tablet    ZANTAC    180 tablet    TAKE 1 TABLET(150 MG) BY MOUTH TWICE DAILY    Gastroesophageal reflux disease without esophagitis       triamcinolone 0.1 % ointment    KENALOG     Apply topically 2 times daily        VIMPAT 200 MG Tabs tablet   Generic drug:  lacosamide      Take by mouth 2 times daily

## 2018-10-24 NOTE — PROGRESS NOTES

## 2018-10-24 NOTE — PROGRESS NOTES
SUBJECTIVE:   Farooq Sorto is a 23 year old male who presents to clinic today for the following health issues:      Patient is present with caretaker.    Ear issues.  He has had both of his ears bothering him.  The patient reports both have had dark brown wax.   No hearing concerns.  Rings at times.  Pain only with cleaning.  Uses a qtip.    Anxiety/depression.  PHQ9 score of 6.  TOSHA of 16.   Sees a psychiatrist.  Does not see a counselor.  Exercises for 5-10 minutes.  He is increasing his cardio- walking twice per week.   He sleeps 10 hours of sleep.     H/o seizures.  Following neurology every 6 months.  Has not had a seizure.       Problem list and histories reviewed & adjusted, as indicated.  Additional history: as documented    Labs reviewed in EPIC    Reviewed and updated as needed this visit by clinical staff  Tobacco  Allergies  Meds  Med Hx  Surg Hx  Fam Hx  Soc Hx      Reviewed and updated as needed this visit by Provider         ROS:  CONSTITUTIONAL: NEGATIVE for fever, chills, change in weight  ENT/MOUTH: brown discharge  RESP: NEGATIVE for significant cough or SOB  CV: NEGATIVE for chest pain, palpitations or peripheral edema    OBJECTIVE:     /90 (BP Location: Left arm, Patient Position: Sitting, Cuff Size: Adult Large)  Pulse 87  Temp 97.4  F (36.3  C) (Oral)  Resp 16  Wt 288 lb 3.2 oz (130.7 kg)  SpO2 96%  BMI 40.2 kg/m2  Body mass index is 40.2 kg/(m^2).  GENERAL: healthy, alert and no distress  ENT: tympanic membrane pearly gray bilaterally  NECK: no adenopathy, no asymmetry, masses, or scars and thyroid normal to palpation  RESP: lungs clear to auscultation - no rales, rhonchi or wheezes  CV: regular rate and rhythm, normal S1 S2, no S3 or S4, no murmur, click or rub    ASSESSMENT/PLAN:     (H61.20) Impacted cerumen, unspecified laterality  (primary encounter diagnosis)  Comment:   Plan: carbamide peroxide (DEBROX) 6.5 % otic         solution, DISCONTINUED: carbamide  peroxide         (DEBROX) 6.5 % otic solution        -counseled not to use a qtip    (F32.0) Mild major depression (H)  Comment:   Plan: MENTAL HEALTH REFERRAL  - Adult; Outpatient         Treatment; Individual/Couples/Family/Group         Therapy/Health Psychology; [facility] [phone]; We will         contact you to schedule the appointment or         please call with any questions            (F41.1) Generalized anxiety disorder  Comment:   Plan: MENTAL HEALTH REFERRAL  - Adult; Outpatient         Treatment; Individual/Couples/Family/Group         Therapy/Health Psychology; [facility] [phone]; We will         contact you to schedule the appointment or         please call with any questions        -add a counselor    (Z23) Need for prophylactic vaccination and inoculation against influenza  Comment:   Plan: FLU VACCINE, SPLIT VIRUS, IM (QUADRIVALENT)         [30796]- >3 YRS, Vaccine Administration,         Initial [53467]              Daina Waldron MD  [facility]

## 2018-10-25 ASSESSMENT — ANXIETY QUESTIONNAIRES: GAD7 TOTAL SCORE: 16

## 2018-11-09 ENCOUNTER — TRANSFERRED RECORDS (OUTPATIENT)
Dept: SURGERY | Facility: CLINIC | Age: 23
End: 2018-11-09

## 2018-11-12 ENCOUNTER — OFFICE VISIT (OUTPATIENT)
Dept: INTERNAL MEDICINE | Facility: CLINIC | Age: 23
End: 2018-11-12
Payer: COMMERCIAL

## 2018-11-12 VITALS
WEIGHT: 288.4 LBS | HEART RATE: 94 BPM | DIASTOLIC BLOOD PRESSURE: 82 MMHG | SYSTOLIC BLOOD PRESSURE: 120 MMHG | RESPIRATION RATE: 17 BRPM | OXYGEN SATURATION: 97 % | BODY MASS INDEX: 40.22 KG/M2 | TEMPERATURE: 98.5 F

## 2018-11-12 DIAGNOSIS — L72.3 SEBACEOUS CYST: Primary | ICD-10-CM

## 2018-11-12 PROCEDURE — 99213 OFFICE O/P EST LOW 20 MIN: CPT | Performed by: INTERNAL MEDICINE

## 2018-11-12 NOTE — MR AVS SNAPSHOT
After Visit Summary   11/12/2018    Farooq Sorto    MRN: [id number]           Patient Information     Date Of Birth          1995        Visit Information        Provider Department      11/12/2018 2:20 PM Jackson Villalobos MD [facility]        Today's Diagnoses     Sebaceous cyst    -  1       Follow-ups after your visit        Additional Services     GENERAL SURG ADULT REFERRAL       Your provider has referred you to: [facility]: [facility] [phone]   [email or web address]    Please be aware that coverage of these services is subject to the terms and limitations of your health insurance plan.  Call member services at your health plan with any benefit or coverage questions.      Please bring the following with you to your appointment:    (1) Any X-Rays, CTs or MRIs which have been performed.  Contact the facility where they were done to arrange for  prior to your scheduled appointment.   (2) List of current medications   (3) This referral request   (4) Any documents/labs given to you for this referral                  Your next 10 appointments already scheduled     Nov 13, 2018  2:40 PM CST   Office Visit with FRANCESCA Tomas CNP   [facility] ([facility])    [address]  [facility] [address] [phone]           Bring a current list of meds and any records pertaining to this visit. For Physicals, please bring immunization records and any forms needing to be filled out. Please arrive 10 minutes early to complete paperwork.              Who to contact     If you have questions or need follow up information about today's clinic visit or your schedule please contact [facility] directly at [phone].  Normal or non-critical lab and imaging results will be communicated to you by MyChart, letter or phone within 4 business  days after the clinic has received the results. If you do not hear from us within 7 days, please contact the clinic through [employer] or phone. If you have a critical or abnormal lab result, we will notify you by phone as soon as possible.  Submit refill requests through [employer] or call your pharmacy and they will forward the refill request to us. Please allow 3 business days for your refill to be completed.          Additional Information About Your Visit        Care EveryWhere ID     This is your Care EveryWhere ID. This could be used by other organizations to access your [place] medical records  [id number]        Your Vitals Were     Pulse Temperature Respirations Pulse Oximetry BMI (Body Mass Index)       94 98.5  F (36.9  C) (Oral) 17 97% 40.22 kg/m2        Blood Pressure from Last 3 Encounters:   11/12/18 120/82   10/24/18 122/90   09/10/18 120/74    Weight from Last 3 Encounters:   11/12/18 288 lb 6.4 oz (130.8 kg)   10/24/18 288 lb 3.2 oz (130.7 kg)   09/10/18 290 lb (131.5 kg)              We Performed the Following     GENERAL SURG ADULT REFERRAL          Today's Medication Changes          These changes are accurate as of 11/12/18  2:43 PM.  If you have any questions, ask your nurse or doctor.               Stop taking these medicines if you haven't already. Please contact your care team if you have questions.     hydrocortisone 2.5 % cream   Commonly known as:  ANUSOL-HC   Stopped by:  aJckson Villalobos MD           IBUPROFEN PO   Stopped by:  Jackson Villalobos MD                    Primary Care Provider Office Phone # Fax #    Daina Tone Waldron [phone]       [address] [facility] [address]        Equal Access to Services     [facility] DOREEN : Chuy Mccarthy, mirela alaniz, sunita zaragoza, janny paige. So [facility] [phone].    ATENCIÓN: Si habla español, tiene a eagle disposición servicios gratuitos de  asistencia lingüística. Michael al [phone].    We comply with applicable federal civil rights laws and [place] laws. We do not discriminate on the basis of race, color, national origin, age, disability, sex, sexual orientation, or gender identity.            Thank you!     Thank you for choosing [facility]  for your care. Our goal is always to provide you with excellent care. Hearing back from our patients is one way we can continue to improve our services. Please take a few minutes to complete the written survey that you may receive in the mail after your visit with us. Thank you!             Your Updated Medication List - Protect others around you: Learn how to safely use, store and throw away your medicines at www.disposemymeds.org.          This list is accurate as of 11/12/18  2:43 PM.  Always use your most recent med list.                   Brand Name Dispense Instructions for use Diagnosis    amantadine 100 MG capsule    SYMMETREL     Take 100 mg by mouth 2 times daily        benzoyl peroxide 10 % topical gel     28 g    Apply topically 2 times daily    Acne vulgaris       carbamide peroxide 6.5 % otic solution    DEBROX    30 mL    Place 5-10 drops into both ears 2 times daily as needed for other (wax)    Impacted cerumen, unspecified laterality       CLONAZEPAM PO      Take 0.5 mg by mouth        DOXYCYCLINE HYCLATE PO      Take 100 mg by mouth 1 tab twice daily        GUANFACINE HCL PO      Take 2 mg by mouth 2 times daily        lamoTRIgine 200 MG tablet    LaMICtal     Take 500 mg by mouth 2 times daily        levETIRAcetam 500 MG tablet    KEPPRA     Take 500 mg by mouth 2 times daily        OLANZapine 10 MG tablet    zyPREXA    90 tablet    Take 1 tablet (10 mg) by mouth At Bedtime    Aggressive behavior, Agitation       Q- MG tablet   Generic drug:  acetaminophen      Take 325 mg by mouth every 4 hours as needed for mild pain or fever        ranitidine 150 MG tablet     ZANTAC    180 tablet    TAKE 1 TABLET(150 MG) BY MOUTH TWICE DAILY    Gastroesophageal reflux disease without esophagitis       triamcinolone 0.1 % ointment    KENALOG     Apply topically 2 times daily        VIMPAT 200 MG Tabs tablet   Generic drug:  lacosamide      Take by mouth 2 times daily

## 2018-11-12 NOTE — NURSING NOTE
/82  Pulse 94  Temp 98.5  F (36.9  C) (Oral)  Resp 17  Wt 288 lb 6.4 oz (130.8 kg)  SpO2 97%  BMI 40.22 kg/m2  Patient in for follow up for cyst on back of neck.  Keyla Clancy, CMA

## 2018-11-12 NOTE — PROGRESS NOTES
SUBJECTIVE:   Farooq Sorto is a 23 year old male who presents to clinic today for the following health issues:    Pt is a 23 year old male who is seen here to day along with care taker  to f/u on cyst on back of his neck which he noticed about 3 weeks ago, the cyst was small to begin with, and enlarged in size, patient was seen in urgent care 1 week ago and the cyst was drained and packing done, packing stayed for 3 days and it was removed about 3 days ago, no drainage.  Patient is requesting referral for surgery as this is the third time he had the cyst on the same area and has been drained x 3 .        Current Outpatient Prescriptions   Medication Sig Dispense Refill     acetaminophen (Q-PAP) 325 MG tablet Take 325 mg by mouth every 4 hours as needed for mild pain or fever       amantadine (SYMMETREL) 100 MG capsule Take 100 mg by mouth 2 times daily        benzoyl peroxide 10 % topical gel Apply topically 2 times daily 28 g 3     carbamide peroxide (DEBROX) 6.5 % otic solution Place 5-10 drops into both ears 2 times daily as needed for other (wax) 30 mL 0     CLONAZEPAM PO Take 0.5 mg by mouth       DOXYCYCLINE HYCLATE PO Take 100 mg by mouth 1 tab twice daily       GUANFACINE HCL PO Take 2 mg by mouth 2 times daily       lacosamide (VIMPAT) 200 MG TABS tablet Take by mouth 2 times daily       lamoTRIgine (LAMICTAL) 200 MG tablet Take 500 mg by mouth 2 times daily        levETIRAcetam (KEPPRA) 500 MG tablet Take 500 mg by mouth 2 times daily       OLANZapine (ZYPREXA) 10 MG tablet Take 1 tablet (10 mg) by mouth At Bedtime 90 tablet 0     ranitidine (ZANTAC) 150 MG tablet TAKE 1 TABLET(150 MG) BY MOUTH TWICE DAILY 180 tablet 2     triamcinolone (KENALOG) 0.1 % ointment Apply topically 2 times daily         Reviewed and updated as needed this visit by clinical staff  Tobacco  Allergies  Meds  Med Hx  Surg Hx  Fam Hx  Soc Hx      Reviewed and updated as needed this visit by Provider          ROS:  CONSTITUTIONAL: NEGATIVE for fever, chills, change in weight  INTEGUMENTARY/SKIN: cyst back pf the neck     OBJECTIVE:                                                    /82  Pulse 94  Temp 98.5  F (36.9  C) (Oral)  Resp 17  Wt 288 lb 6.4 oz (130.8 kg)  SpO2 97%  BMI 40.22 kg/m2  Body mass index is 40.22 kg/(m^2).   GENERAL: healthy, alert, well nourished, well hydrated, no distress  NECK: no tenderness, no adenopathy   SKIN: I&D site on the back of the neck completely healed , no redness or drainage         ASSESSMENT/PLAN:                                                      1. Sebaceous cyst  --s/p I&D, healed well.  Patient and the caretaker requesting referral for surgery as this is his third recurrence.   - GENERAL SURG ADULT REFERRAL       Jackson Villalobos MD  [facility]

## 2018-11-13 ENCOUNTER — TELEPHONE (OUTPATIENT)
Dept: INTERNAL MEDICINE | Facility: CLINIC | Age: 23
End: 2018-11-13

## 2018-11-13 ENCOUNTER — OFFICE VISIT (OUTPATIENT)
Dept: SURGERY | Facility: CLINIC | Age: 23
End: 2018-11-13
Payer: COMMERCIAL

## 2018-11-13 VITALS — WEIGHT: 288 LBS | RESPIRATION RATE: 16 BRPM | BODY MASS INDEX: 40.32 KG/M2 | HEIGHT: 71 IN

## 2018-11-13 DIAGNOSIS — L02.92 BOIL: Primary | ICD-10-CM

## 2018-11-13 PROCEDURE — 99242 OFF/OP CONSLTJ NEW/EST SF 20: CPT | Performed by: SURGERY

## 2018-11-13 NOTE — PROGRESS NOTES
Assessment:    Farooq Sorto is a 23 year old male seen in consultation for a posterior neck infection s/p I/D, at the request of Daina Waldron MD.    Resolved posterior neck infection without residual mass/cyst.    Plan:  I recommend continued observation.  We discussed keeping the neck clean and dry.  Shower daily and after workouts.  Use antibacterial soap on the neck daily.  Avoid tight or occlusive clothing on the neck.  Return to clinic for recurrent infections as needed.    HPI:  Farooq Sorto is a 23 year old male who presents today in consultation for a posterior neck infection.     Duration:  2 weeks rt neck infection  H/o trauma:  No  Drainage:  No.  Three days after I/D one week ago  Previous infections:  Yes: 7/2018 I/D different location on neck  Other such masses now or in the past:  No  Pain:  Yes  Size: left side of neck  increasing    Fever/Chills: No  Currently on antibiotics: Yes - doxycicline chronic per derm  MRSA exposure: No        PMH:  Past Medical History:   Diagnosis Date     ADHD (attention deficit hyperactivity disorder)      Anxiety      Chemical dependency (H)      Depressive disorder      Down syndrome      Seizures (H)        PSH:  Past Surgical History:   Procedure Laterality Date     BACK SURGERY       HEAD & NECK SURGERY      mole removed from head 1994     SOFT TISSUE SURGERY      Hypospadius repair 1994       Allergies:  Allergies   Allergen Reactions     Minocycline Other (See Comments)     seizures     Penicillins Rash       Home Medications:  Current Outpatient Prescriptions   Medication Sig Dispense Refill     acetaminophen (Q-PAP) 325 MG tablet Take 325 mg by mouth every 4 hours as needed for mild pain or fever       amantadine (SYMMETREL) 100 MG capsule Take 100 mg by mouth 2 times daily        benzoyl peroxide 10 % topical gel Apply topically 2 times daily 28 g 3     carbamide peroxide (DEBROX) 6.5 % otic solution Place 5-10 drops into both ears 2 times  "daily as needed for other (wax) 30 mL 0     CLONAZEPAM PO Take 0.5 mg by mouth       DOXYCYCLINE HYCLATE PO Take 100 mg by mouth 1 tab twice daily       GUANFACINE HCL PO Take 2 mg by mouth 2 times daily       lacosamide (VIMPAT) 200 MG TABS tablet Take by mouth 2 times daily       lamoTRIgine (LAMICTAL) 200 MG tablet Take 500 mg by mouth 2 times daily        levETIRAcetam (KEPPRA) 500 MG tablet Take 500 mg by mouth 2 times daily       OLANZapine (ZYPREXA) 10 MG tablet Take 1 tablet (10 mg) by mouth At Bedtime 90 tablet 0     ranitidine (ZANTAC) 150 MG tablet TAKE 1 TABLET(150 MG) BY MOUTH TWICE DAILY 180 tablet 2     triamcinolone (KENALOG) 0.1 % ointment Apply topically 2 times daily         Social History:  Social History   Substance Use Topics     Smoking status: Former Smoker     Years: 1.00     Quit date: 7/16/2016     Smokeless tobacco: Never Used      Comment: smoke when depress     Alcohol use No       Family History:  Family history reviewed and not pertinent.    ROS:  The 10 point review of systems is negative other than noted in the HPI and above.    PE:  Resp 16  Ht 5' 11\" (1.803 m)  Wt 288 lb (130.6 kg)  BMI 40.17 kg/m2  General appearance: well-nourished, no apparent distress  HEENT:  Head normocephalic and atraumatic, pupils equal and round, conjunctivae clear, no scleral icterus, mucous membranes moist, external ears and nose normal  Lungs: respirations unlabored  Neurologic: alert, speech is clear, moves all extremities with good strength  Psychiatric: mood and affect are appropriate  Musculoskeletal:  Normal station and gait  Skin: there is a healing wound on the posterior mid neck.  There is no erythema, induration, fluctuance or drainage.     This note was created using voice recognition software. Undetected word substitutions or other errors may have occurred.     Time spent with the patient with greater that 50% of the time in discussion was 15 minutes.     Adore Shane MD      Please " route or send letter to:  Primary Care Provider (PCP) and Referring Provider

## 2018-11-13 NOTE — MR AVS SNAPSHOT
"              After Visit Summary   11/13/2018    Farooq Sorto    MRN: [id number]           Patient Information     Date Of Birth          1995        Visit Information        Provider Department      11/13/2018 11:30 AM Adore Shane MD [facility] General [facility]      Today's Diagnoses     Boil    -  1       Follow-ups after your visit        Who to contact     If you have questions or need follow up information about today's clinic visit or your schedule please contact [facility] directly at [phone].  Normal or non-critical lab and imaging results will be communicated to you by MyChart, letter or phone within 4 business days after the clinic has received the results. If you do not hear from us within 7 days, please contact the clinic through MyChart or phone. If you have a critical or abnormal lab result, we will notify you by phone as soon as possible.  Submit refill requests through [employer] or call your pharmacy and they will forward the refill request to us. Please allow 3 business days for your refill to be completed.          Additional Information About Your Visit        Care EveryWhere ID     This is your Care EveryWhere ID. This could be used by other organizations to access your [place] medical records  [id number]        Your Vitals Were     Respirations Height BMI (Body Mass Index)             16 1.803 m (5' 11\") 40.17 kg/m2          Blood Pressure from Last 3 Encounters:   11/12/18 120/82   10/24/18 122/90   09/10/18 120/74    Weight from Last 3 Encounters:   11/13/18 130.6 kg (288 lb)   11/12/18 130.8 kg (288 lb 6.4 oz)   10/24/18 130.7 kg (288 lb 3.2 oz)              Today, you had the following     No orders found for display       Primary Care Provider Office Phone # Fax #    Daina Waldron [phone]       [address]  [facility] [address]        Equal Access to Services  "    [facility]: Hadii aad ku hadjareno Sogenevaali, waaxda luqadaha, qaybta kaalmada adeegyada, waxjoseline trevain hayaaburt neelyjarvis wilhelmmauricio dodge . So [facility] [phone].    ATENCIÓN: Si habla español, tiene a eagle disposición servicios gratuitos de asistencia lingüística. Llame al [phone].    We comply with applicable federal civil rights laws and [place] laws. We do not discriminate on the basis of race, color, national origin, age, disability, sex, sexual orientation, or gender identity.            Thank you!     Thank you for choosing [facility]  for your care. Our goal is always to provide you with excellent care. Hearing back from our patients is one way we can continue to improve our services. Please take a few minutes to complete the written survey that you may receive in the mail after your visit with us. Thank you!             Your Updated Medication List - Protect others around you: Learn how to safely use, store and throw away your medicines at www.disposemymeds.org.          This list is accurate as of 11/13/18 11:47 AM.  Always use your most recent med list.                   Brand Name Dispense Instructions for use Diagnosis    amantadine 100 MG capsule    SYMMETREL     Take 100 mg by mouth 2 times daily        benzoyl peroxide 10 % topical gel     28 g    Apply topically 2 times daily    Acne vulgaris       carbamide peroxide 6.5 % otic solution    DEBROX    30 mL    Place 5-10 drops into both ears 2 times daily as needed for other (wax)    Impacted cerumen, unspecified laterality       CLONAZEPAM PO      Take 0.5 mg by mouth        DOXYCYCLINE HYCLATE PO      Take 100 mg by mouth 1 tab twice daily        GUANFACINE HCL PO      Take 2 mg by mouth 2 times daily        lamoTRIgine 200 MG tablet    LaMICtal     Take 500 mg by mouth 2 times daily        levETIRAcetam 500 MG tablet    KEPPRA     Take 500 mg by mouth 2 times daily        OLANZapine 10 MG tablet    zyPREXA    90 tablet    Take  1 tablet (10 mg) by mouth At Bedtime    Aggressive behavior, Agitation       Q- MG tablet   Generic drug:  acetaminophen      Take 325 mg by mouth every 4 hours as needed for mild pain or fever        ranitidine 150 MG tablet    ZANTAC    180 tablet    TAKE 1 TABLET(150 MG) BY MOUTH TWICE DAILY    Gastroesophageal reflux disease without esophagitis       triamcinolone 0.1 % ointment    KENALOG     Apply topically 2 times daily        VIMPAT 200 MG Tabs tablet   Generic drug:  lacosamide      Take by mouth 2 times daily

## 2018-11-13 NOTE — TELEPHONE ENCOUNTER
[facility]  Standing prn comfort med orders and history and physical form  Dr. Waldron's in basket   Fax

## 2018-12-10 DIAGNOSIS — K21.9 GASTROESOPHAGEAL REFLUX DISEASE WITHOUT ESOPHAGITIS: ICD-10-CM

## 2018-12-11 NOTE — TELEPHONE ENCOUNTER
"Requested Prescriptions   Pending Prescriptions Disp Refills     ranitidine (ZANTAC) 150 MG tablet [Pharmacy Med Name: RANITIDINE 150MG TABLETS]  Last Written Prescription Date:  3/20/2018  Last Fill Quantity: 180,  # refills: 2   Last office visit: 11/12/2018 with prescribing provider:     Future Office Visit:   180 tablet 0     Sig: TAKE 1 TABLET(150 MG) BY MOUTH TWICE DAILY    H2 Blockers Protocol Passed - 12/10/2018 10:36 AM       Passed - Patient is age 12 or older       Passed - Recent (12 mo) or future (30 days) visit within the authorizing provider's specialty    Patient had office visit in the last 12 months or has a visit in the next 30 days with authorizing provider or within the authorizing provider's specialty.  See \"Patient Info\" tab in inbasket, or \"Choose Columns\" in Meds & Orders section of the refill encounter.              "

## 2019-01-08 ENCOUNTER — NURSE TRIAGE (OUTPATIENT)
Dept: NURSING | Facility: CLINIC | Age: 24
End: 2019-01-08

## 2019-01-08 ENCOUNTER — OFFICE VISIT (OUTPATIENT)
Dept: FAMILY MEDICINE | Facility: CLINIC | Age: 24
End: 2019-01-08
Payer: COMMERCIAL

## 2019-01-08 VITALS
WEIGHT: 290 LBS | BODY MASS INDEX: 40.45 KG/M2 | SYSTOLIC BLOOD PRESSURE: 146 MMHG | RESPIRATION RATE: 16 BRPM | HEART RATE: 92 BPM | OXYGEN SATURATION: 98 % | TEMPERATURE: 98.1 F | DIASTOLIC BLOOD PRESSURE: 86 MMHG

## 2019-01-08 DIAGNOSIS — H10.9 CONJUNCTIVITIS OF LEFT EYE, UNSPECIFIED CONJUNCTIVITIS TYPE: Primary | ICD-10-CM

## 2019-01-08 PROCEDURE — 99213 OFFICE O/P EST LOW 20 MIN: CPT | Performed by: PHYSICIAN ASSISTANT

## 2019-01-08 RX ORDER — POLYMYXIN B SULFATE AND TRIMETHOPRIM 1; 10000 MG/ML; [USP'U]/ML
1 SOLUTION OPHTHALMIC
Qty: 2 ML | Refills: 0 | Status: SHIPPED | OUTPATIENT
Start: 2019-01-08 | End: 2019-02-25

## 2019-01-08 NOTE — TELEPHONE ENCOUNTER
[facility] caller states Patient treated for conjunctivitis today and asks if he needs a returning to work  note for this evening.    Protocol-  Information Call- AdultCare advice reviewed.   Disposition- Information only  Caller states understanding of the recommended disposition.   Advised to follow up with the work setting to be advised of their policy.   This RN reviewed Provider note and plan of care. Reviewed general prevention and contagiousness information.   Advised to call back if further questions or concerns.     NITZA Carlson RN  [facility]         Additional Information    Health Information question, no triage required and triager able to answer question    Protocols used: INFORMATION ONLY CALL-ADULT-

## 2019-01-08 NOTE — PROGRESS NOTES
SUBJECTIVE:   Farooq Sorto is a 23 year old male who presents to clinic today for the following health issues:      Eye(s) Problem  Onset: 1 week     Description:   Location: left  Pain: YES  Redness: YES    Accompanying Signs & Symptoms:  Discharge/mattering: no   Swelling: YES  Visual changes: YES- blurry   Fever: no   Nasal Congestion: YES    Bothered by bright lights: no     History:   Trauma: no   Foreign body exposure: no     Precipitating factors:   Wearing contacts: no     Alleviating factors:  Improved by:     Therapies Tried and outcome: none        Problem list and histories reviewed & adjusted, as indicated.  Additional history: as documented    Patient Active Problem List   Diagnosis     Aggressive behavior     Substance abuse (H)     Suicidal ideation     Mild major depression (H)     Generalized anxiety disorder     Psychosis (H)     Non morbid obesity due to excess calories     Morbid obesity (H)     Seizure disorder (H)     Past Surgical History:   Procedure Laterality Date     BACK SURGERY       HEAD & NECK SURGERY      mole removed from head      SOFT TISSUE SURGERY      Hypospadius repair        Social History     Tobacco Use     Smoking status: Former Smoker     Years: 1.00     Last attempt to quit: 2016     Years since quittin.4     Smokeless tobacco: Never Used     Tobacco comment: smoke when depress   Substance Use Topics     Alcohol use: No     Alcohol/week: 0.0 oz     Family History   Problem Relation Age of Onset     Seizure Disorder Mother      Diabetes Father      Seizure Disorder Brother      Asthma Brother      Diabetes Maternal Grandfather      Glaucoma No family hx of      Hypertension No family hx of      Retinal detachment No family hx of          Current Outpatient Medications   Medication Sig Dispense Refill     benzoyl peroxide 10 % topical gel Apply topically 2 times daily 28 g 3     carbamide peroxide (DEBROX) 6.5 % otic solution Place 5-10 drops into  both ears 2 times daily as needed for other (wax) 30 mL 0     CLONAZEPAM PO Take 0.5 mg by mouth       DOXYCYCLINE HYCLATE PO Take 100 mg by mouth 1 tab twice daily       GUANFACINE HCL PO Take 2 mg by mouth 2 times daily       lacosamide (VIMPAT) 200 MG TABS tablet Take by mouth 2 times daily       lamoTRIgine (LAMICTAL) 200 MG tablet Take 500 mg by mouth 2 times daily        levETIRAcetam (KEPPRA) 500 MG tablet Take 500 mg by mouth 2 times daily       OLANZapine (ZYPREXA) 10 MG tablet Take 1 tablet (10 mg) by mouth At Bedtime 90 tablet 0     ranitidine (ZANTAC) 150 MG tablet TAKE 1 TABLET(150 MG) BY MOUTH TWICE DAILY 180 tablet 2     triamcinolone (KENALOG) 0.1 % ointment Apply topically 2 times daily       acetaminophen (Q-PAP) 325 MG tablet Take 325 mg by mouth every 4 hours as needed for mild pain or fever       amantadine (SYMMETREL) 100 MG capsule Take 100 mg by mouth 2 times daily        Allergies   Allergen Reactions     Minocycline Other (See Comments)     seizures     Penicillins Rash       Reviewed and updated as needed this visit by clinical staff       Reviewed and updated as needed this visit by Provider         ROS:  Constitutional, HEENT, cardiovascular, pulmonary, gi and gu systems are negative, except as otherwise noted.    OBJECTIVE:     /86 (BP Location: Right arm, Patient Position: Chair, Cuff Size: Adult Regular)   Pulse 92   Temp 98.1  F (36.7  C) (Oral)   Resp 16   Wt 131.5 kg (290 lb)   SpO2 98%   BMI 40.45 kg/m    Body mass index is 40.45 kg/m .  GENERAL: healthy, alert and no distress  EYES: PERRL, EOMI and conjunctiva/corneas- conjunctival injection OS  MS: no gross musculoskeletal defects noted, no edema  SKIN: no suspicious lesions or rashes  NEURO: Normal strength and tone, mentation intact and speech normal  PSYCH: mentation appears normal, affect normal/bright    Diagnostic Test Results:  none     ASSESSMENT/PLAN:       (H10.9) Conjunctivitis of left eye, unspecified  conjunctivitis type  (primary encounter diagnosis)    Comment: Not clearly bacterial conjunctivitis but since patient is at a group home, will treat with antibiotic drops. Possible corneal irritation. Follow-up with eye doctor if not improving in 1 week.    Plan: trimethoprim-polymyxin b (POLYTRIM) 24968-2.1         UNIT/ML-% ophthalmic solution              Patient Instructions   Use eye drops every 4 hours while awake (3-4 times a day).    If not improving in 1 week, follow-up with an eye doctor.      Mann Conde PA-C  [facility]

## 2019-01-08 NOTE — PATIENT INSTRUCTIONS
Use eye drops every 4 hours while awake (3-4 times a day).    If not improving in 1 week, follow-up with an eye doctor.

## 2019-01-15 ENCOUNTER — OFFICE VISIT (OUTPATIENT)
Dept: INTERNAL MEDICINE | Facility: CLINIC | Age: 24
End: 2019-01-15
Payer: COMMERCIAL

## 2019-01-15 VITALS
DIASTOLIC BLOOD PRESSURE: 92 MMHG | HEART RATE: 107 BPM | WEIGHT: 270 LBS | TEMPERATURE: 97.6 F | HEIGHT: 71 IN | RESPIRATION RATE: 17 BRPM | SYSTOLIC BLOOD PRESSURE: 126 MMHG | BODY MASS INDEX: 37.8 KG/M2 | OXYGEN SATURATION: 94 %

## 2019-01-15 DIAGNOSIS — H10.32 ACUTE CONJUNCTIVITIS OF LEFT EYE, UNSPECIFIED ACUTE CONJUNCTIVITIS TYPE: Primary | ICD-10-CM

## 2019-01-15 PROCEDURE — 99213 OFFICE O/P EST LOW 20 MIN: CPT | Performed by: INTERNAL MEDICINE

## 2019-01-15 ASSESSMENT — MIFFLIN-ST. JEOR: SCORE: 2236.84

## 2019-01-15 NOTE — PROGRESS NOTES
ASSESSMENT/PLAN:       1. Acute conjunctivitis of left eye, unspecified acute conjunctivitis type  Minimal conjunctival erythema noted, resolved. No further treatment required    Tramaine Andrew MD  [facility]      SUBJECTIVE:   Farooq Sorto is a 23 year old male who presents to clinic today for the following health issues:    Patient is here for follow up on pink eye. He went to urgent care, and was given polytrim and has applied this as directed.    And his redness has improved greatly    He also has psoriasis and on triamcinolone    Problem list and histories reviewed & adjusted, as indicated.  Additional history: as documented    Patient Active Problem List   Diagnosis     Aggressive behavior     Substance abuse (H)     Suicidal ideation     Mild major depression (H)     Generalized anxiety disorder     Psychosis (H)     Non morbid obesity due to excess calories     Morbid obesity (H)     Seizure disorder (H)     Past Surgical History:   Procedure Laterality Date     BACK SURGERY       HEAD & NECK SURGERY      mole removed from head      SOFT TISSUE SURGERY      Hypospadius repair        Social History     Tobacco Use     Smoking status: Former Smoker     Years: 1.00     Last attempt to quit: 2016     Years since quittin.5     Smokeless tobacco: Never Used     Tobacco comment: smoke when depress   Substance Use Topics     Alcohol use: No     Alcohol/week: 0.0 oz     Family History   Problem Relation Age of Onset     Seizure Disorder Mother      Diabetes Father      Seizure Disorder Brother      Asthma Brother      Diabetes Maternal Grandfather      Glaucoma No family hx of      Hypertension No family hx of      Retinal detachment No family hx of            Reviewed and updated as needed this visit by clinical staff  Tobacco  Allergies  Meds  Med Hx  Surg Hx  Fam Hx  Soc Hx      Reviewed and updated as needed this visit by Provider         ROS:  Constitutional, HEENT,  "cardiovascular, pulmonary, gi and gu systems are negative, except as otherwise noted.    OBJECTIVE:     BP (!) 126/92   Pulse 107   Temp 97.6  F (36.4  C) (Oral)   Resp 17   Ht 1.803 m (5' 11\")   Wt 122.5 kg (270 lb)   SpO2 94%   BMI 37.66 kg/m    Body mass index is 37.66 kg/m .  GENERAL: healthy, alert and no distress  EYES: Eyes grossly normal to inspection, PERRL and conjunctivae with minimal erythema  HENT: ear canals and TM's normal, nose and mouth without ulcers or lesions    Diagnostic Test Results:  none     "

## 2019-02-25 ENCOUNTER — OFFICE VISIT (OUTPATIENT)
Dept: INTERNAL MEDICINE | Facility: CLINIC | Age: 24
End: 2019-02-25
Payer: COMMERCIAL

## 2019-02-25 VITALS
WEIGHT: 289.1 LBS | TEMPERATURE: 97.6 F | HEART RATE: 94 BPM | HEIGHT: 71 IN | SYSTOLIC BLOOD PRESSURE: 126 MMHG | BODY MASS INDEX: 40.47 KG/M2 | RESPIRATION RATE: 16 BRPM | DIASTOLIC BLOOD PRESSURE: 74 MMHG | OXYGEN SATURATION: 95 %

## 2019-02-25 DIAGNOSIS — F32.0 MILD MAJOR DEPRESSION (H): ICD-10-CM

## 2019-02-25 DIAGNOSIS — G40.909 SEIZURE DISORDER (H): ICD-10-CM

## 2019-02-25 DIAGNOSIS — L72.9 SUBCUTANEOUS CYST: Primary | ICD-10-CM

## 2019-02-25 DIAGNOSIS — R73.9 BLOOD GLUCOSE ELEVATED: ICD-10-CM

## 2019-02-25 LAB — HBA1C MFR BLD: 5.9 % (ref 0–5.6)

## 2019-02-25 PROCEDURE — 36415 COLL VENOUS BLD VENIPUNCTURE: CPT | Performed by: INTERNAL MEDICINE

## 2019-02-25 PROCEDURE — 99214 OFFICE O/P EST MOD 30 MIN: CPT | Performed by: INTERNAL MEDICINE

## 2019-02-25 PROCEDURE — 83036 HEMOGLOBIN GLYCOSYLATED A1C: CPT | Performed by: INTERNAL MEDICINE

## 2019-02-25 ASSESSMENT — ANXIETY QUESTIONNAIRES
2. NOT BEING ABLE TO STOP OR CONTROL WORRYING: SEVERAL DAYS
3. WORRYING TOO MUCH ABOUT DIFFERENT THINGS: NEARLY EVERY DAY
5. BEING SO RESTLESS THAT IT IS HARD TO SIT STILL: NEARLY EVERY DAY
7. FEELING AFRAID AS IF SOMETHING AWFUL MIGHT HAPPEN: MORE THAN HALF THE DAYS
6. BECOMING EASILY ANNOYED OR IRRITABLE: NEARLY EVERY DAY
GAD7 TOTAL SCORE: 16
GAD7 TOTAL SCORE: 16
7. FEELING AFRAID AS IF SOMETHING AWFUL MIGHT HAPPEN: MORE THAN HALF THE DAYS
1. FEELING NERVOUS, ANXIOUS, OR ON EDGE: MORE THAN HALF THE DAYS
GAD7 TOTAL SCORE: 16
4. TROUBLE RELAXING: MORE THAN HALF THE DAYS

## 2019-02-25 ASSESSMENT — PATIENT HEALTH QUESTIONNAIRE - PHQ9
SUM OF ALL RESPONSES TO PHQ QUESTIONS 1-9: 10
10. IF YOU CHECKED OFF ANY PROBLEMS, HOW DIFFICULT HAVE THESE PROBLEMS MADE IT FOR YOU TO DO YOUR WORK, TAKE CARE OF THINGS AT HOME, OR GET ALONG WITH OTHER PEOPLE: SOMEWHAT DIFFICULT
SUM OF ALL RESPONSES TO PHQ QUESTIONS 1-9: 10

## 2019-02-25 ASSESSMENT — MIFFLIN-ST. JEOR: SCORE: 2323.48

## 2019-02-25 NOTE — NURSING NOTE
"/74   Pulse 105   Temp 97.6  F (36.4  C) (Oral)   Resp 16   Ht 1.803 m (5' 11\")   Wt 131.1 kg (289 lb 1.6 oz)   SpO2 95%   BMI 40.32 kg/m      "

## 2019-02-25 NOTE — LETTER
February 27, 2019      Farooq Sorto  [address] [place] [address]        Dear ,    We are writing to inform you of your test results.    Hemoglobin A1c reveals pre-diabetes.  Recommendations are for a healthy weight (BMI of 20-25), healthy diet (please let us know if you would like to see a nutritionist), and exercise (150 minutes per week) to prevent diabetes.    Resulted Orders   Hemoglobin A1c   Result Value Ref Range    Hemoglobin A1C 5.9 (H) 0 - 5.6 %      Comment:      Normal <5.7% Prediabetes 5.7-6.4%  Diabetes 6.5% or higher - adopted from ADA   consensus guidelines.  Reviewed: OK with previous         If you have any questions or concerns, please call the clinic at the number listed above.       Sincerely,        Daina Waldron MD

## 2019-02-25 NOTE — PROGRESS NOTES
"  SUBJECTIVE:   Farooq Sorto is a 23 year old male who presents to clinic today for the following health issues:    Cyst on the back.  He reports that he has had an increase in his cyst for 1.5 weeks.  No fevers or chills.  He has had recurrence of this approximately 5 times.     Of note, last HgA1c was 6.4%.    Seizures disorder.  Patient has not had a seizure for approximately one year.  He follows with neurology every 6 months.  He is compliant with his medications.  He lives at a group home.     Depression and Anxiety Follow-Up    Status since last visit: No change    Other associated symptoms:None    Complicating factors:     Significant life event: No     Current substance abuse: None    PHQ 2/21/2018 9/10/2018 10/24/2018   PHQ-9 Total Score 7 3 6   Q9: Suicide Ideation Not at all Not at all Not at all     TOSHA-7 SCORE 8/30/2017 10/24/2018 2/25/2019   Total Score - -  (minimal anxiety)   Total Score 11 16 -       PHQ-9  English  PHQ-9   Any Language  TOSHA-7  Suicide Assessment Five-step Evaluation and Treatment (SAFE-T)      Amount of exercise or physical activity: None; trying to go to the gym to play basketball    Problems taking medications regularly: No    Medication side effects: none    Diet: regular (no restrictions)        Problem list and histories reviewed & adjusted, as indicated.      Reviewed and updated as needed this visit by clinical staff       Reviewed and updated as needed this visit by Provider         ROS:  CONSTITUTIONAL: NEGATIVE for fever, chills, change in weight  RESP: NEGATIVE for significant cough or SOB  CV: NEGATIVE for chest pain, palpitations or peripheral edema  Neuro: h/o seizure  Psych: anxiety and depression  Skin: cyst    OBJECTIVE:     /74   Pulse 94   Temp 97.6  F (36.4  C) (Oral)   Resp 16   Ht 1.803 m (5' 11\")   Wt 131.1 kg (289 lb 1.6 oz)   SpO2 95%   BMI 40.32 kg/m    Body mass index is 40.32 kg/m .  GENERAL: healthy, alert and no distress  RESP: " lungs clear to auscultation - no rales, rhonchi or wheezes  CV: regular rate and rhythm, normal S1 S2, no S3 or S4, no murmur, click or rub   Skin: 1cm cyst on back of neck, which is tender to touch    ASSESSMENT/PLAN:         (L72.9) Subcutaneous cyst  (primary encounter diagnosis)  Comment: recurrent cysts  Plan: GENERAL SURG ADULT REFERRAL  -warm compresses to area        -counseled on washing daily with soap and water  -will recheck blood glucose    (G40.909) Seizure disorder (H)  Comment: stable  Plan: follow up with neurology    (F32.0) Mild major depression (H)  Comment: stable  Plan: continue on current medication    (R73.9) Blood glucose elevated  Comment: reassess  Plan: Hemoglobin A1c                Daina Waldron MD  [facility]    Answers for HPI/ROS submitted by the patient on 2/25/2019   If you checked off any problems, how difficult have these problems made it for you to do your work, take care of things at home, or get along with other people?: Somewhat difficult  PHQ9 TOTAL SCORE: 10  TOSHA 7 TOTAL SCORE: 16

## 2019-02-26 ENCOUNTER — TELEPHONE (OUTPATIENT)
Dept: SURGERY | Facility: CLINIC | Age: 24
End: 2019-02-26

## 2019-02-26 ENCOUNTER — OFFICE VISIT (OUTPATIENT)
Dept: SURGERY | Facility: CLINIC | Age: 24
End: 2019-02-26
Payer: COMMERCIAL

## 2019-02-26 VITALS
BODY MASS INDEX: 40.46 KG/M2 | RESPIRATION RATE: 16 BRPM | HEIGHT: 71 IN | WEIGHT: 289 LBS | DIASTOLIC BLOOD PRESSURE: 86 MMHG | OXYGEN SATURATION: 96 % | HEART RATE: 94 BPM | SYSTOLIC BLOOD PRESSURE: 136 MMHG

## 2019-02-26 DIAGNOSIS — L02.92 BOIL: Primary | ICD-10-CM

## 2019-02-26 PROCEDURE — 99213 OFFICE O/P EST LOW 20 MIN: CPT | Performed by: SURGERY

## 2019-02-26 ASSESSMENT — PATIENT HEALTH QUESTIONNAIRE - PHQ9: SUM OF ALL RESPONSES TO PHQ QUESTIONS 1-9: 10

## 2019-02-26 ASSESSMENT — MIFFLIN-ST. JEOR: SCORE: 2323.03

## 2019-02-26 ASSESSMENT — ANXIETY QUESTIONNAIRES: GAD7 TOTAL SCORE: 16

## 2019-02-26 NOTE — LETTER
"2019       Re: Farooq Sorto - 1995    Last seen 2018 for recurrent neck infections in multiple locations.  At the time of our last meeting, he had a resolved posterior neck infection (s/p I/D) without residual mass/cyst.     Previous I/D in 2018.  He had an I/D in 2018 at a different location.     /86   Pulse 94   Resp 16   Ht 1.803 m (5' 11\")   Wt 131.1 kg (289 lb)   SpO2 96%   BMI 40.31 kg/m    General appearance: well-developed, well-nourished, no apparent distress  HEENT:  Head normocephalic and atraumatic, pupils equal and round, conjunctivae clear, mucous membranes moist, external ears and nose normal  Lungs: respirations unlabored  Musculoskeletal:  Normal station and gait  Neurologic: nonfocal, grossly intact times four extremities, alert and oriented times three  Psychiatric: Mood and affect are appropriate  Skin: There is a 4 cm area of induration on the mid portion of the posterior neck.  The overlying skin is mildly erythematous.  It is very tender.  No discrete mass/cyst palpable.  This may represent a deep boil or furuncle.      Assessment:    Farooq Sorto is a 23 year old male seen in consultation for a recurrent posterior neck infection, at the request of Daina Waldron MD.     Multiple neck infections in the past at different sites.  Some requiring I/D.  ~5th episode of infection involving the posterior midline of the neck.      Plan:  We discussed the excisional debridement of posterior neck infection, indications, alternatives, and risks.  We discussed scarring, numbness, anesthesia, infection, bleeding, and recurrence.  The patient understands that the wound will be left  open to heal by secondary intent.  Dressing changes will need to be performed daily.     The patient prefers dressing changes to be done in the clinic when possible.  The patient will bring a parent on the day of surgery for consent.     Adore Shane MD       "

## 2019-02-26 NOTE — PROGRESS NOTES
"  General Surgery Follow up    Last seen Nov 2018 for recurrent neck infections in multiple locations.  At the time of our last meeting, he had a resolved posterior neck infection (s/p I/D) without residual mass/cyst.    Previous I/D in 11/2018.  He had an I/D in 7/2018 at a different location.    /86   Pulse 94   Resp 16   Ht 1.803 m (5' 11\")   Wt 131.1 kg (289 lb)   SpO2 96%   BMI 40.31 kg/m    General appearance: well-developed, well-nourished, no apparent distress  HEENT:  Head normocephalic and atraumatic, pupils equal and round, conjunctivae clear, mucous membranes moist, external ears and nose normal  Lungs: respirations unlabored  Musculoskeletal:  Normal station and gait  Neurologic: nonfocal, grossly intact times four extremities, alert and oriented times three  Psychiatric: Mood and affect are appropriate  Skin: There is a 4 cm area of induration on the mid portion of the posterior neck.  The overlying skin is mildly erythematous.  It is very tender.  No discrete mass/cyst palpable.  This may represent a deep boil or furuncle.     Assessment:    Farooq Sorto is a 23 year old male seen in consultation for a recurrent posterior neck infection, at the request of Daina Waldron MD.     Multiple neck infections in the past at different sites.  Some requiring I/D.  ~5th episode of infection involving the posterior midline of the neck.        Plan:  We discussed the excisional debridement of posterior neck infection, indications, alternatives, and risks.  We discussed scarring, numbness, anesthesia, infection, bleeding, and recurrence.  The patient understands that the wound will be left  open to heal by secondary intent.  Dressing changes will need to be performed daily.    The patient prefers dressing changes to be done in the clinic when possible.  The patient will bring a parent on the day of surgery for consent.    Adore Shane MD     Time spent of which more than 50% was " counseling and coordinating care:  10 minutes.    This note was created using voice recognition software. Undetected word substitutions or other errors may have occurred.     Please route or send letter to:  Primary Care Provider (PCP) and Referring Provider

## 2019-02-26 NOTE — TELEPHONE ENCOUNTER
Type of surgery: EXCISIONAL DEBRIDEMENT POSTERIOR NECK INFECTION   Location of surgery: [facility]  Date and time of surgery: 2-28-19, 7:30 AM   Surgeon: DR. KRYSTLE MARADIAGA   Pre-Op Appt Date: 2/27/19  Post-Op Appt Date: PATIENT TO SCHEDULE    Packet sent out: GIVEN TO PATIENT   Pre-cert/Authorization completed:  Not Applicable  Date: 2-26-19      EXCISIONAL DEBRIDEMENT POSTERIOR NECK INFECTION   GENERAL  PT INST TO HAVE H&P   45 MINS REQ   NON   ALW     Adequate: hears normal conversation without difficulty

## 2019-02-27 ENCOUNTER — OFFICE VISIT (OUTPATIENT)
Dept: INTERNAL MEDICINE | Facility: CLINIC | Age: 24
End: 2019-02-27
Payer: COMMERCIAL

## 2019-02-27 VITALS
TEMPERATURE: 98 F | DIASTOLIC BLOOD PRESSURE: 82 MMHG | OXYGEN SATURATION: 94 % | HEART RATE: 106 BPM | RESPIRATION RATE: 22 BRPM | WEIGHT: 286 LBS | BODY MASS INDEX: 40.04 KG/M2 | HEIGHT: 71 IN | SYSTOLIC BLOOD PRESSURE: 116 MMHG

## 2019-02-27 DIAGNOSIS — Z01.818 PREOP GENERAL PHYSICAL EXAM: Primary | ICD-10-CM

## 2019-02-27 PROCEDURE — 99214 OFFICE O/P EST MOD 30 MIN: CPT | Performed by: INTERNAL MEDICINE

## 2019-02-27 ASSESSMENT — MIFFLIN-ST. JEOR: SCORE: 2309.42

## 2019-02-27 NOTE — NURSING NOTE
"Chief Complaint   Patient presents with     Pre-Op Exam     initial /82   Pulse 106   Temp 98  F (36.7  C) (Oral)   Resp 22   Ht 1.803 m (5' 11\")   Wt 129.7 kg (286 lb)   SpO2 94%   BMI 39.89 kg/m   Estimated body mass index is 39.89 kg/m  as calculated from the following:    Height as of this encounter: 1.803 m (5' 11\").    Weight as of this encounter: 129.7 kg (286 lb)..  bp completed using cuff size large  RITA FISHER LPN  "

## 2019-02-27 NOTE — PROGRESS NOTES
[place] [address]  [facility] [address]  [phone]  Dept: [phone]    PRE-OP EVALUATION:  Today's date: 2019    Farooq Sorto (: 1995) presents for pre-operative evaluation assessment as requested by Dr. Shane.  He requires evaluation and anesthesia risk assessment prior to undergoing surgery/procedure for treatment of neck infection .    Fax number for surgical facility:   Primary Physician: Daina Waldron  Type of Anesthesia Anticipated: General    Patient has a Health Care Directive or Living Will:  NO    Preop Questions 2019   What are you having done? cyst   Date of Surgery/Procedure: 19   Facility or Hospital where procedure/surgery will be performed: [facility]   1.  Do you have a history of Heart attack, stroke, stent, coronary bypass surgery, or other heart surgery? No   2.  Do you ever have any pain or discomfort in your chest? No   3.  Do you have a history of  Heart Failure? No   4.   Are you troubled by shortness of breath when:  walking on a level surface, or up a slight hill, or at night? No   5.  Do you currently have a cold, bronchitis or other respiratory infection? No   6.  Do you have a cough, shortness of breath, or wheezing? No   7.  Do you sometimes get pains in the calves of your legs when you walk? No   8. Do you or anyone in your family have previous history of blood clots? No   9.  Do you or does anyone in your family have a serious bleeding problem such as prolonged bleeding following surgeries or cuts? No   10. Have you ever had problems with anemia or been told to take iron pills? No   11. Have you had any abnormal blood loss such as black, tarry or bloody stools? No   12. Have you ever had a blood transfusion? No   13. Have you or any of your relatives ever had problems with anesthesia? No   14. Do you have sleep apnea, excessive snoring or daytime drowsiness? No   15. Do you have any prosthetic heart valves?  No   16. Do you have prosthetic joints? No         HPI:     HPI related to upcoming procedure:     Excisional debridement of posterior neck furuncle      DEPRESSION - Patient has a long history of Depression of moderate severity requiring medication for control with recent symptoms being stable..Current symptoms of depression include none.                                                                                                                                                                                    .    MEDICAL HISTORY:     Patient Active Problem List    Diagnosis Date Noted     Morbid obesity (H) 09/10/2018     Priority: Medium     Seizure disorder (H) 09/10/2018     Priority: Medium     Non morbid obesity due to excess calories 09/23/2016     Priority: Medium     Psychosis (H) 05/20/2015     Priority: Medium     Mild major depression (H) 04/08/2015     Priority: Medium     Generalized anxiety disorder 04/08/2015     Priority: Medium     Diagnosis updated by automated process. Provider to review and confirm.       Suicidal ideation 02/27/2015     Priority: Medium     Substance abuse (H) 10/17/2012     Priority: Medium     Aggressive behavior 10/16/2012     Priority: Medium      Past Medical History:   Diagnosis Date     ADHD (attention deficit hyperactivity disorder)      Anxiety      Chemical dependency (H)      Depressive disorder      Down syndrome      Seizures (H)      Past Surgical History:   Procedure Laterality Date     BACK SURGERY       HEAD & NECK SURGERY      mole removed from head 1994     SOFT TISSUE SURGERY      Hypospadius repair 1994     Current Outpatient Medications   Medication Sig Dispense Refill     acetaminophen (Q-PAP) 325 MG tablet Take 325 mg by mouth every 4 hours as needed for mild pain or fever       amantadine (SYMMETREL) 100 MG capsule Take 100 mg by mouth 2 times daily        benzoyl peroxide 10 % topical gel Apply topically 2 times daily 28 g 3     carbamide  peroxide (DEBROX) 6.5 % otic solution Place 5-10 drops into both ears 2 times daily as needed for other (wax) 30 mL 0     CLONAZEPAM PO Take 0.5 mg by mouth       DOXYCYCLINE HYCLATE PO Take 100 mg by mouth 1 tab twice daily       GUANFACINE HCL PO Take 2 mg by mouth 2 times daily       lacosamide (VIMPAT) 200 MG TABS tablet Take by mouth 2 times daily       lamoTRIgine (LAMICTAL) 100 MG tablet Take 500 mg by mouth 2 times daily        levETIRAcetam (KEPPRA) 500 MG tablet Take 500 mg by mouth 2 times daily       OLANZapine (ZYPREXA) 10 MG tablet Take 1 tablet (10 mg) by mouth At Bedtime 90 tablet 0     ranitidine (ZANTAC) 150 MG tablet TAKE 1 TABLET(150 MG) BY MOUTH TWICE DAILY 180 tablet 2     triamcinolone (KENALOG) 0.1 % ointment Apply topically 2 times daily       OTC products: None, except as noted above    Allergies   Allergen Reactions     Minocycline Other (See Comments)     seizures     Penicillins Rash      Latex Allergy: NO    Social History     Tobacco Use     Smoking status: Current Some Day Smoker     Years: 1.00     Last attempt to quit: 2016     Years since quittin.6     Smokeless tobacco: Never Used     Tobacco comment: smoke when depress   Substance Use Topics     Alcohol use: No     Alcohol/week: 0.0 oz     History   Drug Use No       REVIEW OF SYSTEMS:   CONSTITUTIONAL: NEGATIVE for fever, chills, change in weight  INTEGUMENTARY/SKIN:+ skin furuncle  EYES: NEGATIVE for vision changes or irritation  ENT/MOUTH: NEGATIVE for ear, mouth and throat problems  RESP: NEGATIVE for significant cough or SOB  CV: NEGATIVE for chest pain, palpitations or peripheral edema  GI: NEGATIVE for nausea, abdominal pain, heartburn, or change in bowel habits  : NEGATIVE for frequency, dysuria, or hematuria  MUSCULOSKELETAL: NEGATIVE for significant arthralgias or myalgia  NEURO: NEGATIVE for weakness, dizziness or paresthesias  ENDOCRINE: NEGATIVE for temperature intolerance, skin/hair changes  HEME:  "NEGATIVE for bleeding problems  PSYCHIATRIC: NEGATIVE for changes in mood or affect    EXAM:   Ht 1.803 m (5' 11\")   BMI 40.31 kg/m      GENERAL APPEARANCE: healthy, alert and no distress     EYES: EOMI,  PERRL     HENT: ear canals and TM's normal and nose and mouth without ulcers or lesions     NECK: no adenopathy, no asymmetry, masses, or scars and thyroid normal to palpation     RESP: lungs clear to auscultation - no rales, rhonchi or wheezes     CV: regular rates and rhythm, normal S1 S2, no S3 or S4 and no murmur, click or rub     ABDOMEN:  soft, nontender, no HSM or masses and bowel sounds normal     MS: extremities normal- no gross deformities noted, no evidence of inflammation in joints, FROM in all extremities.     SKIN: no suspicious lesions or rashes     NEURO: Normal strength and tone, sensory exam grossly normal, mentation intact and speech normal     PSYCH: mentation appears normal. and affect normal/bright     LYMPHATICS: No cervical adenopathy    DIAGNOSTICS:   No labs or EKG required for low risk surgery (cataract, skin procedure, breast biopsy, etc)    Recent Labs   Lab Test 02/25/19  1429 09/10/18  1544 06/23/18  0130   HGB  --  16.4 15.6   PLT  --  274 356   NA  --  142 138   POTASSIUM  --  4.0 3.8   CR  --  0.97 0.79   A1C 5.9* 6.4*  --         IMPRESSION:   Reason for surgery/procedure: excisional debridement   Diagnosis/reason for consult: preop    The proposed surgical procedure is considered LOW risk.    REVISED CARDIAC RISK INDEX  The patient has the following serious cardiovascular risks for perioperative complications such as (MI, PE, VFib and 3  AV Block):  No serious cardiac risks  INTERPRETATION: 0 risks: Class I (very low risk - 0.4% complication rate)    The patient has the following additional risks for perioperative complications:  No identified additional risks  Morbid obesity      ICD-10-CM    1. Preop general physical exam Z01.818        RECOMMENDATIONS:       Pulmonary " Risk  Incentive spirometry post op  Respiratory Therapy (Respiratory Care IP Consult)  post op  NG tube decompression if abdominal distension or significant vomiting   Advised smoking cessation.       --Patient is to take all scheduled medications on the day of surgery EXCEPT for modifications listed below.    APPROVAL GIVEN to proceed with proposed procedure, without further diagnostic evaluation       Signed Electronically by: Tramaine Andrew MD    Copy of this evaluation report is provided to requesting physician.    [facility] Preop Guidelines    Revised Cardiac Risk Index

## 2019-02-28 ENCOUNTER — ANESTHESIA (OUTPATIENT)
Dept: SURGERY | Facility: CLINIC | Age: 24
End: 2019-02-28
Payer: COMMERCIAL

## 2019-02-28 ENCOUNTER — APPOINTMENT (OUTPATIENT)
Dept: SURGERY | Facility: PHYSICIAN GROUP | Age: 24
End: 2019-02-28
Payer: COMMERCIAL

## 2019-02-28 ENCOUNTER — ANESTHESIA EVENT (OUTPATIENT)
Dept: SURGERY | Facility: CLINIC | Age: 24
End: 2019-02-28
Payer: COMMERCIAL

## 2019-02-28 ENCOUNTER — HOSPITAL ENCOUNTER (OUTPATIENT)
Facility: CLINIC | Age: 24
Discharge: HOME | End: 2019-02-28
Attending: SURGERY | Admitting: SURGERY
Payer: COMMERCIAL

## 2019-02-28 VITALS
DIASTOLIC BLOOD PRESSURE: 94 MMHG | BODY MASS INDEX: 39.62 KG/M2 | TEMPERATURE: 98.6 F | RESPIRATION RATE: 12 BRPM | WEIGHT: 283 LBS | HEART RATE: 104 BPM | HEIGHT: 71 IN | SYSTOLIC BLOOD PRESSURE: 154 MMHG | OXYGEN SATURATION: 91 %

## 2019-02-28 DIAGNOSIS — L02.11 ABSCESS OF NECK: Primary | ICD-10-CM

## 2019-02-28 LAB
GRAM STN SPEC: NORMAL
GRAM STN SPEC: NORMAL
INTERPRETATION ECG - MUSE: NORMAL
Lab: NORMAL
SPECIMEN SOURCE: NORMAL

## 2019-02-28 PROCEDURE — 71000013 ZZH RECOVERY PHASE 1 LEVEL 1 EA ADDTL HR: Performed by: SURGERY

## 2019-02-28 PROCEDURE — 36000093 ZZH SURGERY LEVEL 4 1ST 30 MIN: Performed by: SURGERY

## 2019-02-28 PROCEDURE — 87075 CULTR BACTERIA EXCEPT BLOOD: CPT | Performed by: SURGERY

## 2019-02-28 PROCEDURE — 25800030 ZZH RX IP 258 OP 636: Performed by: ANESTHESIOLOGY

## 2019-02-28 PROCEDURE — 87070 CULTURE OTHR SPECIMN AEROBIC: CPT | Performed by: SURGERY

## 2019-02-28 PROCEDURE — 25000125 ZZHC RX 250: Performed by: NURSE ANESTHETIST, CERTIFIED REGISTERED

## 2019-02-28 PROCEDURE — 87077 CULTURE AEROBIC IDENTIFY: CPT | Performed by: SURGERY

## 2019-02-28 PROCEDURE — 71000027 ZZH RECOVERY PHASE 2 EACH 15 MINS: Performed by: SURGERY

## 2019-02-28 PROCEDURE — 25000128 H RX IP 250 OP 636: Performed by: SURGERY

## 2019-02-28 PROCEDURE — 25000125 ZZHC RX 250: Performed by: ANESTHESIOLOGY

## 2019-02-28 PROCEDURE — 25000128 H RX IP 250 OP 636: Performed by: ANESTHESIOLOGY

## 2019-02-28 PROCEDURE — 10060 I&D ABSCESS SIMPLE/SINGLE: CPT | Mod: 59 | Performed by: SURGERY

## 2019-02-28 PROCEDURE — 40000306 ZZH STATISTIC PRE PROC ASSESS II: Performed by: SURGERY

## 2019-02-28 PROCEDURE — 87076 CULTURE ANAEROBE IDENT EACH: CPT | Performed by: SURGERY

## 2019-02-28 PROCEDURE — 87205 SMEAR GRAM STAIN: CPT | Performed by: SURGERY

## 2019-02-28 PROCEDURE — 36000063 ZZH SURGERY LEVEL 4 EA 15 ADDTL MIN: Performed by: SURGERY

## 2019-02-28 PROCEDURE — 71000012 ZZH RECOVERY PHASE 1 LEVEL 1 FIRST HR: Performed by: SURGERY

## 2019-02-28 PROCEDURE — 37000009 ZZH ANESTHESIA TECHNICAL FEE, EACH ADDTL 15 MIN: Performed by: SURGERY

## 2019-02-28 PROCEDURE — 93010 ELECTROCARDIOGRAM REPORT: CPT | Performed by: INTERNAL MEDICINE

## 2019-02-28 PROCEDURE — 25000128 H RX IP 250 OP 636: Performed by: NURSE ANESTHETIST, CERTIFIED REGISTERED

## 2019-02-28 PROCEDURE — 25000125 ZZHC RX 250: Performed by: SURGERY

## 2019-02-28 PROCEDURE — 27210794 ZZH OR GENERAL SUPPLY STERILE: Performed by: SURGERY

## 2019-02-28 PROCEDURE — 37000008 ZZH ANESTHESIA TECHNICAL FEE, 1ST 30 MIN: Performed by: SURGERY

## 2019-02-28 RX ORDER — ONDANSETRON 4 MG/1
4 TABLET, ORALLY DISINTEGRATING ORAL EVERY 30 MIN PRN
Status: DISCONTINUED | OUTPATIENT
Start: 2019-02-28 | End: 2019-02-28 | Stop reason: HOSPADM

## 2019-02-28 RX ORDER — PROPOFOL 10 MG/ML
INJECTION, EMULSION INTRAVENOUS PRN
Status: DISCONTINUED | OUTPATIENT
Start: 2019-02-28 | End: 2019-02-28

## 2019-02-28 RX ORDER — NEOSTIGMINE METHYLSULFATE 1 MG/ML
VIAL (ML) INJECTION PRN
Status: DISCONTINUED | OUTPATIENT
Start: 2019-02-28 | End: 2019-02-28

## 2019-02-28 RX ORDER — LABETALOL 20 MG/4 ML (5 MG/ML) INTRAVENOUS SYRINGE
10
Status: DISCONTINUED | OUTPATIENT
Start: 2019-02-28 | End: 2019-02-28 | Stop reason: HOSPADM

## 2019-02-28 RX ORDER — CEFAZOLIN SODIUM IN 0.9 % NACL 3 G/100 ML
3 INTRAVENOUS SOLUTION, PIGGYBACK (ML) INTRAVENOUS
Status: COMPLETED | OUTPATIENT
Start: 2019-02-28 | End: 2019-02-28

## 2019-02-28 RX ORDER — LIDOCAINE 40 MG/G
CREAM TOPICAL
Status: DISCONTINUED | OUTPATIENT
Start: 2019-02-28 | End: 2019-02-28 | Stop reason: HOSPADM

## 2019-02-28 RX ORDER — AMOXICILLIN 250 MG
1-2 CAPSULE ORAL 2 TIMES DAILY
Qty: 30 TABLET | Refills: 0 | Status: SHIPPED | OUTPATIENT
Start: 2019-02-28

## 2019-02-28 RX ORDER — ONDANSETRON 2 MG/ML
4 INJECTION INTRAMUSCULAR; INTRAVENOUS EVERY 30 MIN PRN
Status: DISCONTINUED | OUTPATIENT
Start: 2019-02-28 | End: 2019-02-28 | Stop reason: HOSPADM

## 2019-02-28 RX ORDER — HYDROCODONE BITARTRATE AND ACETAMINOPHEN 5; 325 MG/1; MG/1
1 TABLET ORAL
Status: DISCONTINUED | OUTPATIENT
Start: 2019-02-28 | End: 2019-02-28 | Stop reason: HOSPADM

## 2019-02-28 RX ORDER — ONDANSETRON 2 MG/ML
INJECTION INTRAMUSCULAR; INTRAVENOUS PRN
Status: DISCONTINUED | OUTPATIENT
Start: 2019-02-28 | End: 2019-02-28

## 2019-02-28 RX ORDER — ACETAMINOPHEN 325 MG/1
650 TABLET ORAL EVERY 4 HOURS PRN
Qty: 50 TABLET | Refills: 0 | Status: SHIPPED | OUTPATIENT
Start: 2019-02-28

## 2019-02-28 RX ORDER — FENTANYL CITRATE 50 UG/ML
25-50 INJECTION, SOLUTION INTRAMUSCULAR; INTRAVENOUS
Status: DISCONTINUED | OUTPATIENT
Start: 2019-02-28 | End: 2019-02-28 | Stop reason: HOSPADM

## 2019-02-28 RX ORDER — IBUPROFEN 600 MG/1
600 TABLET, FILM COATED ORAL
Status: DISCONTINUED | OUTPATIENT
Start: 2019-02-28 | End: 2019-02-28 | Stop reason: HOSPADM

## 2019-02-28 RX ORDER — SODIUM CHLORIDE, SODIUM LACTATE, POTASSIUM CHLORIDE, CALCIUM CHLORIDE 600; 310; 30; 20 MG/100ML; MG/100ML; MG/100ML; MG/100ML
INJECTION, SOLUTION INTRAVENOUS CONTINUOUS
Status: DISCONTINUED | OUTPATIENT
Start: 2019-02-28 | End: 2019-02-28 | Stop reason: HOSPADM

## 2019-02-28 RX ORDER — HYDROMORPHONE HYDROCHLORIDE 1 MG/ML
.3-.5 INJECTION, SOLUTION INTRAMUSCULAR; INTRAVENOUS; SUBCUTANEOUS EVERY 5 MIN PRN
Status: DISCONTINUED | OUTPATIENT
Start: 2019-02-28 | End: 2019-02-28 | Stop reason: HOSPADM

## 2019-02-28 RX ORDER — CEFAZOLIN SODIUM 1 G/3ML
1 INJECTION, POWDER, FOR SOLUTION INTRAMUSCULAR; INTRAVENOUS SEE ADMIN INSTRUCTIONS
Status: DISCONTINUED | OUTPATIENT
Start: 2019-02-28 | End: 2019-02-28 | Stop reason: HOSPADM

## 2019-02-28 RX ORDER — GLYCOPYRROLATE 0.2 MG/ML
INJECTION, SOLUTION INTRAMUSCULAR; INTRAVENOUS PRN
Status: DISCONTINUED | OUTPATIENT
Start: 2019-02-28 | End: 2019-02-28

## 2019-02-28 RX ORDER — ALBUTEROL SULFATE 0.83 MG/ML
2.5 SOLUTION RESPIRATORY (INHALATION) EVERY 4 HOURS PRN
Status: DISCONTINUED | OUTPATIENT
Start: 2019-02-28 | End: 2019-02-28 | Stop reason: HOSPADM

## 2019-02-28 RX ORDER — ONDANSETRON 4 MG/1
4-8 TABLET, ORALLY DISINTEGRATING ORAL EVERY 8 HOURS PRN
Qty: 4 TABLET | Refills: 0 | Status: SHIPPED | OUTPATIENT
Start: 2019-02-28

## 2019-02-28 RX ORDER — FENTANYL CITRATE 50 UG/ML
INJECTION, SOLUTION INTRAMUSCULAR; INTRAVENOUS PRN
Status: DISCONTINUED | OUTPATIENT
Start: 2019-02-28 | End: 2019-02-28

## 2019-02-28 RX ORDER — NALOXONE HYDROCHLORIDE 0.4 MG/ML
.1-.4 INJECTION, SOLUTION INTRAMUSCULAR; INTRAVENOUS; SUBCUTANEOUS
Status: DISCONTINUED | OUTPATIENT
Start: 2019-02-28 | End: 2019-02-28 | Stop reason: HOSPADM

## 2019-02-28 RX ORDER — KETOROLAC TROMETHAMINE 30 MG/ML
INJECTION, SOLUTION INTRAMUSCULAR; INTRAVENOUS PRN
Status: DISCONTINUED | OUTPATIENT
Start: 2019-02-28 | End: 2019-02-28

## 2019-02-28 RX ORDER — DEXAMETHASONE SODIUM PHOSPHATE 4 MG/ML
INJECTION, SOLUTION INTRA-ARTICULAR; INTRALESIONAL; INTRAMUSCULAR; INTRAVENOUS; SOFT TISSUE PRN
Status: DISCONTINUED | OUTPATIENT
Start: 2019-02-28 | End: 2019-02-28

## 2019-02-28 RX ORDER — MAGNESIUM HYDROXIDE 1200 MG/15ML
LIQUID ORAL PRN
Status: DISCONTINUED | OUTPATIENT
Start: 2019-02-28 | End: 2019-02-28 | Stop reason: HOSPADM

## 2019-02-28 RX ORDER — LIDOCAINE HYDROCHLORIDE 10 MG/ML
INJECTION, SOLUTION INFILTRATION; PERINEURAL PRN
Status: DISCONTINUED | OUTPATIENT
Start: 2019-02-28 | End: 2019-02-28

## 2019-02-28 RX ORDER — BUPIVACAINE HYDROCHLORIDE 5 MG/ML
INJECTION, SOLUTION EPIDURAL; INTRACAUDAL PRN
Status: DISCONTINUED | OUTPATIENT
Start: 2019-02-28 | End: 2019-02-28 | Stop reason: HOSPADM

## 2019-02-28 RX ORDER — HYDROCODONE BITARTRATE AND ACETAMINOPHEN 5; 325 MG/1; MG/1
1-2 TABLET ORAL EVERY 4 HOURS PRN
Qty: 15 TABLET | Refills: 0 | Status: SHIPPED | OUTPATIENT
Start: 2019-02-28 | End: 2019-04-18

## 2019-02-28 RX ORDER — DIAZEPAM 10 MG/2ML
2.5 INJECTION, SOLUTION INTRAMUSCULAR; INTRAVENOUS
Status: DISCONTINUED | OUTPATIENT
Start: 2019-02-28 | End: 2019-02-28 | Stop reason: HOSPADM

## 2019-02-28 RX ORDER — MEPERIDINE HYDROCHLORIDE 50 MG/ML
12.5 INJECTION INTRAMUSCULAR; INTRAVENOUS; SUBCUTANEOUS EVERY 5 MIN PRN
Status: DISCONTINUED | OUTPATIENT
Start: 2019-02-28 | End: 2019-02-28 | Stop reason: HOSPADM

## 2019-02-28 RX ORDER — IBUPROFEN 600 MG/1
600 TABLET, FILM COATED ORAL EVERY 6 HOURS PRN
Qty: 100 TABLET | Refills: 0 | Status: SHIPPED | OUTPATIENT
Start: 2019-02-28

## 2019-02-28 RX ORDER — HYDRALAZINE HYDROCHLORIDE 20 MG/ML
2.5-5 INJECTION INTRAMUSCULAR; INTRAVENOUS EVERY 10 MIN PRN
Status: DISCONTINUED | OUTPATIENT
Start: 2019-02-28 | End: 2019-02-28 | Stop reason: HOSPADM

## 2019-02-28 RX ORDER — DIMENHYDRINATE 50 MG/ML
25 INJECTION, SOLUTION INTRAMUSCULAR; INTRAVENOUS
Status: DISCONTINUED | OUTPATIENT
Start: 2019-02-28 | End: 2019-02-28 | Stop reason: HOSPADM

## 2019-02-28 RX ADMIN — SODIUM CHLORIDE, POTASSIUM CHLORIDE, SODIUM LACTATE AND CALCIUM CHLORIDE: 600; 310; 30; 20 INJECTION, SOLUTION INTRAVENOUS at 07:37

## 2019-02-28 RX ADMIN — DEXAMETHASONE SODIUM PHOSPHATE 8 MG: 4 INJECTION, SOLUTION INTRA-ARTICULAR; INTRALESIONAL; INTRAMUSCULAR; INTRAVENOUS; SOFT TISSUE at 07:42

## 2019-02-28 RX ADMIN — MIDAZOLAM 2 MG: 1 INJECTION INTRAMUSCULAR; INTRAVENOUS at 07:37

## 2019-02-28 RX ADMIN — FENTANYL CITRATE 100 MCG: 50 INJECTION, SOLUTION INTRAMUSCULAR; INTRAVENOUS at 07:42

## 2019-02-28 RX ADMIN — FENTANYL CITRATE 50 MCG: 50 INJECTION, SOLUTION INTRAMUSCULAR; INTRAVENOUS at 09:26

## 2019-02-28 RX ADMIN — GLYCOPYRROLATE 0.6 MG: 0.2 INJECTION, SOLUTION INTRAMUSCULAR; INTRAVENOUS at 08:33

## 2019-02-28 RX ADMIN — LIDOCAINE HYDROCHLORIDE 50 MG: 10 INJECTION, SOLUTION INFILTRATION; PERINEURAL at 07:42

## 2019-02-28 RX ADMIN — Medication 3 G: at 07:45

## 2019-02-28 RX ADMIN — KETOROLAC TROMETHAMINE 30 MG: 30 INJECTION, SOLUTION INTRAMUSCULAR at 07:42

## 2019-02-28 RX ADMIN — FENTANYL CITRATE 50 MCG: 50 INJECTION, SOLUTION INTRAMUSCULAR; INTRAVENOUS at 08:31

## 2019-02-28 RX ADMIN — PROPOFOL 200 MG: 10 INJECTION, EMULSION INTRAVENOUS at 07:42

## 2019-02-28 RX ADMIN — GLYCOPYRROLATE 0.2 MG: 0.2 INJECTION, SOLUTION INTRAMUSCULAR; INTRAVENOUS at 07:42

## 2019-02-28 RX ADMIN — Medication 5 MG: at 08:33

## 2019-02-28 RX ADMIN — FENTANYL CITRATE 50 MCG: 50 INJECTION, SOLUTION INTRAMUSCULAR; INTRAVENOUS at 08:23

## 2019-02-28 RX ADMIN — SODIUM CHLORIDE, POTASSIUM CHLORIDE, SODIUM LACTATE AND CALCIUM CHLORIDE: 600; 310; 30; 20 INJECTION, SOLUTION INTRAVENOUS at 10:15

## 2019-02-28 RX ADMIN — ALBUTEROL SULFATE 2.5 MG: 2.5 SOLUTION RESPIRATORY (INHALATION) at 08:57

## 2019-02-28 RX ADMIN — ONDANSETRON 4 MG: 2 INJECTION INTRAMUSCULAR; INTRAVENOUS at 07:42

## 2019-02-28 RX ADMIN — ROCURONIUM BROMIDE 50 MG: 10 INJECTION INTRAVENOUS at 07:42

## 2019-02-28 ASSESSMENT — ENCOUNTER SYMPTOMS: SEIZURES: 1

## 2019-02-28 ASSESSMENT — LIFESTYLE VARIABLES: TOBACCO_USE: 1

## 2019-02-28 ASSESSMENT — MIFFLIN-ST. JEOR: SCORE: 2295.81

## 2019-02-28 NOTE — OP NOTE
General Surgery Operative Note      Pre-operative diagnosis: Excisional debridement of posterior neck and right groin abscesses   Post-operative diagnosis: Same    Procedure: Excisional debridement of posterior neck abscess;   Excisional debridement of right groin hidradenitis x3   Surgeon: Adore Shane MD   Assistant(s): NONE   Anesthesia: General    Estimated blood loss: 5 cc     Specimens: ID Type Source Tests Collected by Time Destination   1 : Posterior Neck Abcess Fluid Neck ANAEROBIC BACTERIAL CULTURE, FLUID CULTURE AEROBIC BACTERIAL, GRAM STAIN Adore Shane MD 2/28/2019  8:06 AM           Under general anesthetic, the posterior neck was prepped and draped in standard sterile fashion.  The skin overlying the mass was anesthetized with local anesthetic.  An ellipse of skin, incorporating the 2 overlying punctae was made with a length of 3 cm.  The incision was carried into the subcutaneous tissue and a large pocket of purulent fluid was unroofed.  Cultures were sent.  Approximately 10 cc of pus was evacuated.  The wound was irrigated with sterile saline.  Hemostasis was maintained throughout with electrocautery. The cavity (3 x 3 x 3cm) was packed with 1 inch iodoform gauze.  A sterile dressing was applied.       The patient was placed in the supine position and the right groin was prepped and draped in standard sterile fashion.  Local anesthetic was infused into the skin and subcutaneous tissue.  3 draining wounds were unroofed measuring 1 cm, 5 mm, and 3 mm in diameter.  These wounds were 4 mm in depth.  Scant purulent fluid was evacuated.  The wounds were irrigated with sterile saline.  The wounds were packed with a small amount of iodoform gauze and a sterile dressing was applied.  The patient tolerated the procedure well.  Sponge and needle counts were correct at the end of the case.     Adore Shane MD

## 2019-02-28 NOTE — ANESTHESIA POSTPROCEDURE EVALUATION
Patient: Farooq Sorto    Procedure(s):  excisional debridement posterior neck infection  Excisional  Debridement Right Groin    Diagnosis:posterior neck infection  Diagnosis Additional Information: Neck and groin abscesses  Dr. Shane    Anesthesia Type:  General, ETT    Note:  Anesthesia Post Evaluation    Patient location during evaluation: PACU  Patient participation: Able to fully participate in evaluation  Level of consciousness: awake  Pain management: adequate  Airway patency: patent  Cardiovascular status: acceptable and hypertensive (at baseline)  Respiratory status: acceptable  Hydration status: acceptable  PONV: none             Last vitals:  Vitals:    02/28/19 1045 02/28/19 1100 02/28/19 1142   BP:  (!) 140/91 (!) 154/94   Pulse:      Resp: 9 10 12   Temp:  98.7  F (37.1  C) 98.6  F (37  C)   SpO2: 92%  91%         Electronically Signed By: Geovany Ng MD  February 28, 2019  12:14 PM  
[facility]

## 2019-02-28 NOTE — DISCHARGE INSTRUCTIONS
Special instructions for Farooqshanel Sorto:  Your diagnosis is called hidradenitis suppurativa.  It affects sweat gland bearing skin (neck, groins, armpits, areas with skin to skin contact).  Apply gauze or large Band-Aid to the three shallow right groin wounds daily.  No need to pack these wounds.  Use saline moist wet-to-dry 1 inch Nu Gauze packed in the posterior neck wound once daily.  Call office [phone] for posterior neck dressing changes with a physician assistant as needed.  Take one Norco prior to appointment as needed for the first week.  You have an appointment with the physician assistant on  at 3:30 PM.     HOME CARE FOLLOWING DEBRIDEMENT  ABRAHAM Cantu, DAVID Eden, BHAVNA Black, HUGO Molina    WOUND CARE:    Dressing changes should be done one or two times a day as recommended by your surgeon.    Dressing change of wet-to-dry dressin. Remove outer layer of dressing material  2. Moisten the packing in the wound, either with saline or in the shower (it is ok if soap gets into the packing/wound area). 3. After the packing is adequately moistened, slowly remove the packing material and rinse the wound with saline or allow water from your shower rinse the wound by allowing it to run down into the site (NOT directly hitting the site).  4. Once the wound bed is cleansed, pat the area dry.  5. Repack the wound with saline-moistened gauze or recommended packing material.  Try not to allow the moistened gauze to contact your normal skin outside of the wound.  6. Apply over-lay absorbant dry gauze and tape in place.     If you have any questions or concerns about your wound or wound care, or would like further instruction, please contact your surgeon's office.  If you have a complicated wound and have been instructed by a Specialized Wound Care Nurse during your hospitalization, and they have given you contact information to reach them, you may also contact  them.    ACTIVITY:  Minimize lifting and stretching of area of debridement and the closest extremity (arm or leg) until further recommended by your surgeon.  If your surgery site is in the abdomen, restrict from overhead reaching and/or stretching.    DIET:  No restrictions.  Increased fluid intake is recommended. While taking pain medications, increase dietary fiber or add a fiber supplementation like Metamucil or Citrucel to help prevent constipation - a possible side effect of pain medications.    DISCOMFORT:  Begin taking pain pills before discomfort is severe.  Take the pain medication with some food, when possible, to minimize side effects.  Intermittent use of ice packs may help.  Expect gradual improvement.    RETURN APPOINTMENT:  As recommended by the surgeon.   Office Phone:  [phone]     CONTACT US IF THE FOLLOWING DEVELOPS:   1. A fever that is above 101     2. If there is a large amount of drainage, bleeding, or swelling.   3. Severe pain that is not relieved by your prescription.   4. Drainage that is thick, cloudy, yellow, green or white.   5. Any other questions not answered by  Frequently Asked Questions  sheet.        FREQUENTLY ASKED QUESTIONS:    Q:  How should my incision look?    A:  Normally your incision will appear slightly swollen with light redness directly along the incision itself as it heals.  It may feel like a bump or ridge as the healing/scarring happens, and over time (3-4 months) this bump or ridge feeling should slowly go away.  In general, clear or pink watery drainage can be normal at first as your incision heals, but should decrease over time.    Q:  How do I know if my incision is infected?  A:  Look at your incision for signs of infection, like redness around the incision spreading to surrounding skin, or drainage of cloudy or foul-smelling drainage.  If you feel warm, check your temperature to see if you are running a fever.    **If any of these things occur, please  notify the nurse at our office.  We may need you to come into the office for an incision check.      Q:  How do I take care of my incision?  A:  If you have a dressing in place - Starting the day after surgery, replace the dressing 1-2 times a day until there is no further drainage from the incision.  At that time, a dressing is no longer needed.  Try to minimize tape on the skin if irritation is occurring at the tape sites.  If you have significant irritation from tape on the skin, please call the office to discuss other method of dressing your incision.    Small pieces of tape called  steri-strips  may be present directly overlying your incision; these may be removed 10 days after surgery unless otherwise specified by your surgeon.  If these tapes start to loosen at the ends, you may trim them back until they fall off or are removed.    A:  If you had  Dermabond  tissue glue used as a dressing (this causes your incision to look shiny with a clear covering over it) - This type of dressing wears off with time and does not require more dressings over the top unless it is draining around the glue as it wears off.  Do not apply ointments or lotions over the incisions until the glue has completely worn off.    Q:  There is a piece of tape or a sticky  lead  still on my skin.  Can I remove this?  A:  Sometimes the sticky  leads  used for monitoring during surgery or for evaluation in the emergency department are not all removed while you are in the hospital.  These sometimes have a tab or metal dot on them.  You can easily remove these on your own, like taking off a band-aid.  If there is a gel substance under the  lead , simply wipe/clean it off with a washcloth or paper towel.      Q:  What can I do to minimize constipation (very hard stools, or lack of stools)?  A:  Stay well hydrated.  Increase your dietary fiber intake or take a fiber supplement -with plenty of water.  Walk around frequently.  You may consider an  over-the-counter stool-softener.  Your Pharmacist can assist you with choosing one that is stocked at your pharmacy.  Constipation is also one of the most common side effects of pain medication.  If you are using pain medication, be pro-active and try to PREVENT problems with constipation by taking the steps above BEFORE constipation becomes a problem.    Q:  What do I do if I need more pain medications?  A:  Call the office to receive refills.  Be aware that certain pain meds cannot be called into a pharmacy and actually require a paper prescription.  A change may be made in your pain med as you progress thru your recovery period or if you have side effects to certain meds.    --Pain meds are NOT refilled after 5pm on weekdays, and NOT AT ALL on the weekends, so please look ahead to prevent problems.      Q:  Why am I having a hard time sleeping now that I am at home?  A:  Many medications you receive while you are in the hospital can impact your sleep for a number of days after your surgery/hospitalization.  Decreased level of activity and naps during the day may also make sleeping at night difficult.  Try to minimize day-time naps, and get up frequently during the day to walk around your home during your recovery time.  Sleep aides may be of some help, but are not recommended for long-term use.      Q:  I am having some back discomfort.  What should I do?  A:  This may be related to certain positioning that was required for your surgery, extended periods of time in bed, or other changes in your overall activity level.  You may try ice, heat, acetaminophen, or ibuprofen to treat this temporarily.  Note that many pain medications have acetaminophen in them and would state this on the prescription bottle.  Be sure not to exceed the maximum of 4000mg per day of acetaminophen.     **If the pain you are having does not resolve, is severe, or is a flare of back pain you have had on other occasions prior to surgery,  please contact your primary physician for further recommendations or for an appointment to be examined at their office.    Q:  Why am I having headaches?  A:  Headaches can be caused by many things:  caffeine withdrawal, use of pain meds, dehydration, high blood pressure, lack of sleep, over-activity/exhaustion, flare-up of usual migraine headaches.  If you feel this is related to muscle tension (a band-like feeling around the head, or a pressure at the low-back of the head) you may try ice or heat to this area.  You may need to drink more fluids (try electrolyte drink like Gatorade), rest, or take your usual migraine medications.   **If your headaches do not resolve, worsen, are accompanied by other symptoms, or if your blood pressure is high, please call your primary physician for recommendation and/or examination.    Q:  I am unable to urinate.  What do I do?  A:  A small percentage of people can have difficulty urinating initially after surgery.  This includes being able to urinate only a very small amount at a time and feeling discomfort or pressure in the very low abdomen.  This is called  urinary retention , and is actually an urgent situation.  Proceed to your nearest Emergency department for evaluation (not an Urgent Care Center).  Sometimes the bladder does not work correctly after certain medications you receive during surgery, or related to certain procedures.  You may need to have a catheter placed until your bladder recovers.  When planning to go to an Emergency department, it may help to call the ER to let them know you are coming in for this problem after a surgery.  This may help you get in quicker to be evaluated.  **If you have symptoms of a urinary tract infection, please contact your primary physician for the proper evaluation and treatment.          If you have other questions, please call the office Monday thru Friday between 8am and 5pm to discuss with the nurse or physician assistant.   #[phone]    There is a surgeon ON CALL on weekday evenings and over the weekend in case of urgent need only, and may be contacted at the same number.    If you are having an emergency, call 911 or proceed to your nearest emergency department.        GENERAL ANESTHESIA OR SEDATION ADULT DISCHARGE INSTRUCTIONS   SPECIAL PRECAUTIONS FOR 24 HOURS AFTER SURGERY    IT IS NOT UNUSUAL TO FEEL LIGHT-HEADED OR FAINT, UP TO 24 HOURS AFTER SURGERY OR WHILE TAKING PAIN MEDICATION.  IF YOU HAVE THESE SYMPTOMS; SIT FOR A FEW MINUTES BEFORE STANDING AND HAVE SOMEONE ASSIST YOU WHEN YOU GET UP TO WALK OR USE THE BATHROOM.    YOU SHOULD REST AND RELAX FOR THE NEXT 24 HOURS AND YOU MUST MAKE ARRANGEMENTS TO HAVE SOMEONE STAY WITH YOU FOR AT LEAST 24 HOURS AFTER YOUR DISCHARGE.  AVOID HAZARDOUS AND STRENUOUS ACTIVITIES.  DO NOT MAKE IMPORTANT DECISIONS FOR 24 HOURS.    DO NOT DRIVE ANY VEHICLE OR OPERATE MECHANICAL EQUIPMENT FOR 24 HOURS FOLLOWING THE END OF YOUR SURGERY.  EVEN THOUGH YOU MAY FEEL NORMAL, YOUR REACTIONS MAY BE AFFECTED BY THE MEDICATION YOU HAVE RECEIVED.    DO NOT DRINK ALCOHOLIC BEVERAGES FOR 24 HOURS FOLLOWING YOUR SURGERY.    DRINK CLEAR LIQUIDS (APPLE JUICE, GINGER ALE, 7-UP, BROTH, ETC.).  PROGRESS TO YOUR REGULAR DIET AS YOU FEEL ABLE.    YOU MAY HAVE A DRY MOUTH, A SORE THROAT, MUSCLES ACHES OR TROUBLE SLEEPING.  THESE SHOULD GO AWAY AFTER 24 HOURS.    CALL YOUR DOCTOR FOR ANY OF THE FOLLOWING:  SIGNS OF INFECTION (FEVER, GROWING TENDERNESS AT THE SURGERY SITE, A LARGE AMOUNT OF DRAINAGE OR BLEEDING, SEVERE PAIN, FOUL-SMELLING DRAINAGE, REDNESS OR SWELLING.    IT HAS BEEN OVER 8 TO 10 HOURS SINCE SURGERY AND YOU ARE STILL NOT ABLE TO URINATE (PASS WATER).

## 2019-02-28 NOTE — ANESTHESIA PREPROCEDURE EVALUATION
Anesthesia Pre-Procedure Evaluation    Patient: Farooq Sorto   MRN: [id number] : 1995          Preoperative Diagnosis: posterior neck infection    Procedure(s):  excisional debridement posterior neck infection    Past Medical History:   Diagnosis Date     ADHD (attention deficit hyperactivity disorder)      Anxiety      Chemical dependency (H)      Depressive disorder      Down syndrome      Seizures (H)      Past Surgical History:   Procedure Laterality Date     BACK SURGERY       HEAD & NECK SURGERY      mole removed from head      SOFT TISSUE SURGERY      Hypospadius repair      Anesthesia Evaluation     . Pt has had prior anesthetic. Type: General    No history of anesthetic complications          ROS/MED HX    ENT/Pulmonary:     (+)JENA risk factors obese, tobacco use, Past use , . .    Neurologic:     (+)seizures other neuro Down's syndrome    Cardiovascular:  - neg cardiovascular ROS       METS/Exercise Tolerance:     Hematologic:  - neg hematologic  ROS       Musculoskeletal:  - neg musculoskeletal ROS       GI/Hepatic:  - neg GI/hepatic ROS       Renal/Genitourinary:  - ROS Renal section negative       Endo: Comment: Class 3 obesity    (+) Obesity, .      Psychiatric:     (+) psychiatric history anxiety and depression (hx of suicidal ideation)      Infectious Disease:   (+) Other Infectious Disease infected cyst in neck      Malignancy:      - no malignancy   Other:    - neg other ROS                      Physical Exam  Normal systems: cardiovascular and pulmonary    Airway   Mallampati: III  TM distance: >3 FB  Neck ROM: full    Dental   (+) chipped and missing    Cardiovascular   Rhythm and rate: regular and normal      Pulmonary     Other findings: Lab Test        09/10/18     06/23/18     08/30/17                       1544          0130          1201          WBC          5.9          9.5          6.0           HGB          16.4         15.6         16.1          MCV          90     "       90           91            PLT          274          356          295            Lab Test        09/10/18     06/23/18     10/09/17                       1544          0130          1103          NA           142          138          140           POTASSIUM    4.0          3.8          5.1           CHLORIDE     107          103          103           CO2          27 27 32            BUN          13           8            10            CR           0.97         0.79         1.07          ANIONGAP     8            8            5             FAIZA          9.4          9.1          9.6           GLC          97           91           91                           Lab Results   Component Value Date    WBC 5.9 09/10/2018    HGB 16.4 09/10/2018    HCT 47.9 09/10/2018     09/10/2018     09/10/2018    POTASSIUM 4.0 09/10/2018    CHLORIDE 107 09/10/2018    CO2 27 09/10/2018    BUN 13 09/10/2018    CR 0.97 09/10/2018    GLC 97 09/10/2018    FAIZA 9.4 09/10/2018    ALBUMIN 4.1 09/10/2018    PROTTOTAL 8.7 09/10/2018    ALT 56 09/10/2018    AST 25 09/10/2018     (H) 02/12/2014    ALKPHOS 151 (H) 09/10/2018    BILITOTAL 0.6 09/10/2018    TSH 2.99 09/10/2018       Preop Vitals  BP Readings from Last 3 Encounters:   02/27/19 116/82   02/26/19 136/86   02/25/19 126/74    Pulse Readings from Last 3 Encounters:   02/27/19 106   02/26/19 94   02/25/19 94      Resp Readings from Last 3 Encounters:   02/27/19 22   02/26/19 16   02/25/19 16    SpO2 Readings from Last 3 Encounters:   02/27/19 94%   02/26/19 96%   02/25/19 95%      Temp Readings from Last 1 Encounters:   02/27/19 98  F (36.7  C) (Oral)    Ht Readings from Last 1 Encounters:   02/27/19 1.803 m (5' 11\")      Wt Readings from Last 1 Encounters:   02/27/19 129.7 kg (286 lb)    Estimated body mass index is 39.89 kg/m  as calculated from the following:    Height as of 2/27/19: 1.803 m (5' 11\").    Weight as of 2/27/19: 129.7 kg (286 " [place]).       Anesthesia Plan      History & Physical Review  History and physical reviewed and following examination; no interval change.    ASA Status:  3 .    NPO Status:  > 8 hours    Plan for General and ETT with Intravenous induction. Maintenance will be Balanced.    PONV prophylaxis:  Ondansetron (or other 5HT-3) and Dexamethasone or Solumedrol  Additional equipment: Videolaryngoscope      Postoperative Care  Postoperative pain management:  IV analgesics, Oral pain medications, Multi-modal analgesia and Peripheral nerve block (Single Shot).      Consents  Anesthetic plan, risks, benefits and alternatives discussed with:  Patient and legal guardian.  Use of blood products discussed: No .   .                 Geovany Ng MD                    .

## 2019-02-28 NOTE — OR NURSING
Pt with elevated BP on discharge - spoke with carmela Logan to send home as BP is better than it was on presentation to facility.    Pt discharged with packing in place, understanding that patient is to present to the surgeon's office tomorrow afternoon for initial dressing change.

## 2019-02-28 NOTE — ANESTHESIA CARE TRANSFER NOTE
Patient: Farooq Sorto    Procedure(s):  excisional debridement posterior neck infection  Excisional  Debridement Right Groin    Diagnosis: posterior neck infection  Diagnosis Additional Information: No value filed.    Anesthesia Type:   General, ETT     Note:  Airway :Face Mask  Patient transferred to:PACU  Comments: VSS.  Spontaneously breathing O2 per simple face mask.  Report given to RN.  Assisted RN with placing 30 Israeli nasal trumpet in right nare without difficulty.Handoff Report: Identifed the Patient, Identified the Reponsible Provider, Reviewed the pertinent medical history, Discussed the surgical course, Reviewed Intra-OP anesthesia mangement and issues during anesthesia, Set expectations for post-procedure period and Allowed opportunity for questions and acknowledgement of understanding      Vitals: (Last set prior to Anesthesia Care Transfer)    CRNA VITALS  2/28/2019 0812 - 2/28/2019 0853      2/28/2019             Pulse:  109    SpO2:  94 %    Resp Rate (observed):  22                Electronically Signed By: FRANCESCA Salazar CRNA  February 28, 2019  8:53 AM

## 2019-03-01 ENCOUNTER — OFFICE VISIT (OUTPATIENT)
Dept: SURGERY | Facility: CLINIC | Age: 24
End: 2019-03-01
Payer: COMMERCIAL

## 2019-03-01 VITALS
SYSTOLIC BLOOD PRESSURE: 110 MMHG | RESPIRATION RATE: 16 BRPM | WEIGHT: 283 LBS | DIASTOLIC BLOOD PRESSURE: 68 MMHG | HEIGHT: 71 IN | HEART RATE: 85 BPM | BODY MASS INDEX: 39.62 KG/M2 | OXYGEN SATURATION: 95 %

## 2019-03-01 DIAGNOSIS — Z09 SURGICAL FOLLOWUP VISIT: Primary | ICD-10-CM

## 2019-03-01 PROCEDURE — 99024 POSTOP FOLLOW-UP VISIT: CPT | Performed by: PHYSICIAN ASSISTANT

## 2019-03-01 ASSESSMENT — MIFFLIN-ST. JEOR: SCORE: 2295.81

## 2019-03-01 NOTE — PROGRESS NOTES
"[facility]   Subjective:  Farooq Sorto is here with group home aide for wound check/care and education of future daily home wound care.  He underwent excisional debridement of the posterior neck abscess and right groin abscesses/hidradenitis sites by Dr. Shane on February/28.  Gram stain: negative for organisms, +many WBCs.  Culture pending.  -Pain control: norco, recent dose for today's wound care.   -Current wound care at home includes:  Dressings have been in place since procedure yesterday.    Objective:  Blood pressure 110/68, pulse 85, resp. rate 16, height 1.803 m (5' 11\"), weight 128.4 kg (283 lb), SpO2 95 %.  Wound/tissue appearance/intervention: right groin: induration around 3 sites, no significant erythema, moderate drainage on dressing.  Wound spray used to moisten packing and remove.  Scant bleeding noted from inferiormost wound.  Cleansed with wound spray and redressed all 3 sites with one gauze/tape overlay dressing (no packing).  Posterior neck: +induration and mild erythema around wound, moderate drainage on packing.  Wound spray used to moisten packing and remove.  Site cleansed with wound spray, undermining noted at the left superior aspect of the wound bed.  +bleeding from this area after removal of packing, dry gauze/pressure applied.  New 1\"plain NuGauze packing tape placed with overlay dry gauze/tape dressing.  Education done throughout process for group home aide who will be assisting with this care.      Assessment:  -Wound care at right groin and posterior neck sites, s/p Excisional debridement of the posterior neck abscess and right groin abscesses/hidradenitis sites  -Gram Stain negative for organisms, culture pending.    Plan:  Reviewed and educated on wound care regimen.  Printed instructions given and paperwork for group home completed.  Supplies provided to pt for home use: gauze, tape, swabs, wound spray, 1\" NuGauze packing tape, adhesive remover.  " Continue Norco 1 hour prior to wound care, use acetaminophen, ibuprofen, or ice packs for pain control remainder of the day if at all possible (minimize narcotic).    Recommended to return to office for recheck in 1 week with PA; return sooner if additional education needed or issues arise.    Farooq is recommended to contact the office if worsening pain, redness, or drainage from the area or onset of fever/chills.  He is in agreement with this plan.      Valarie Chilel PA-C      Please route or send letter to:  Primary Care Provider (PCP)

## 2019-03-04 ENCOUNTER — TELEPHONE (OUTPATIENT)
Dept: SURGERY | Facility: CLINIC | Age: 24
End: 2019-03-04

## 2019-03-04 ENCOUNTER — OFFICE VISIT (OUTPATIENT)
Dept: SURGERY | Facility: CLINIC | Age: 24
End: 2019-03-04
Payer: COMMERCIAL

## 2019-03-04 VITALS
SYSTOLIC BLOOD PRESSURE: 126 MMHG | HEART RATE: 110 BPM | BODY MASS INDEX: 39.62 KG/M2 | HEIGHT: 71 IN | DIASTOLIC BLOOD PRESSURE: 78 MMHG | RESPIRATION RATE: 16 BRPM | OXYGEN SATURATION: 95 % | WEIGHT: 283 LBS

## 2019-03-04 DIAGNOSIS — Z09 SURGICAL FOLLOWUP VISIT: Primary | ICD-10-CM

## 2019-03-04 PROCEDURE — 99024 POSTOP FOLLOW-UP VISIT: CPT | Performed by: PHYSICIAN ASSISTANT

## 2019-03-04 ASSESSMENT — MIFFLIN-ST. JEOR: SCORE: 2295.81

## 2019-03-04 NOTE — LETTER
"2019    RE: Farooq Sorto, : 1995      [facility] Note   Subjective:  Farooq Sorto is here for routine wound care and education of his group home aid ([employer]). He underwent excisional debridement of the posterior neck abscess and right groin abscesses/hidradenitis sites by Dr. Shane on . Gram stain: negative for organisms, +many WBCs.  Preliminary culture showing staph and diptheroids. Today he tells me that he has some itching and burning sensation at the wound site. They have been changing the packing daily and putting overlay gauze dressings on prn.     Objective:  Blood pressure 126/78, pulse 110, resp. rate 16, height 1.803 m (5' 11\"), weight 128.4 kg (283 lb), SpO2 95 %.      Wound/tissue appearance/intervention:   Right groin: 3 sites, minimal induration, no erythema, very mild drainage on dressing.  Cleansed with wound spray and redressed all 3 sites with one gauze/tape overlay dressing (no packing).    Posterior neck: +minimal induration around wound, no erythema moderate drainage on packing, +mild tenderness. Wound spray used to moisten packing and remove. Site cleansed with wound spray, undermining noted at the left superior aspect of the wound bed.  New 1/2\"plain NuGauze packing tape placed with overlay dry gauze/tape dressing.  Education done throughout process for group home aide who will be assisting with this care.       Assessment:  -Wound care at right groin and posterior neck sites, s/p Excisional debridement of the posterior neck abscess and right groin abscesses/hidradenitis sites  -Preliminary culture results: Staph and diptheroids     Plan:  Reviewed and educated on wound care regimen. Some additional supplies provided to pt for home use: 1/2\" NuGauze packing tape, adhesive remover, skin protectant. Continue Norco 1 hour prior to wound care, use acetaminophen, ibuprofen, or ice packs for pain control remainder of the day if at all possible " (minimize narcotic).     Patient would like to come to our office daily for wound care. I think 1-2 times per weeks is reasonable since his home aid has been doing wound care just fine and wound looks great.      Farooq is recommended to contact the office if worsening pain, redness, or drainage from the area or onset of fever/chills.  He is in agreement with this plan.        Alexandru Davis PA-C

## 2019-03-04 NOTE — TELEPHONE ENCOUNTER
"Bill (caregiver) called back and spoke with nursing.  Appointment scheduled with clinic PA-C for today.  Lakshmi Martinez RN on 3/4/2019 at 12:23 PM    Left message on caregiver \"bill\" voicemail.  To please call back regarding Farooq- would like patient to return to clinic for appointment to reassess wound, additional education on wound care.  Lakshmi Martinez RN on 3/4/2019 at 10:27 AM  "

## 2019-03-04 NOTE — PROGRESS NOTES
"[facility] Note   Subjective:  Farooq Sorto is here for routine wound care and education of his group home aid ([facility]). He underwent excisional debridement of the posterior neck abscess and right groin abscesses/hidradenitis sites by Dr. Shane on February/28. Gram stain: negative for organisms, +many WBCs.  Preliminary culture showing staph and diptheroids. Today he tells me that he has some itching and burning sensation at the wound site. They have been changing the packing daily and putting overlay gauze dressings on prn.    Objective:  Blood pressure 126/78, pulse 110, resp. rate 16, height 1.803 m (5' 11\"), weight 128.4 kg (283 lb), SpO2 95 %.     Wound/tissue appearance/intervention:   Right groin: 3 sites, minimal induration, no erythema, very mild drainage on dressing.  Cleansed with wound spray and redressed all 3 sites with one gauze/tape overlay dressing (no packing).    Posterior neck: +minimal induration around wound, no erythema moderate drainage on packing, +mild tenderness. Wound spray used to moisten packing and remove. Site cleansed with wound spray, undermining noted at the left superior aspect of the wound bed.  New 1/2\"plain NuGauze packing tape placed with overlay dry gauze/tape dressing.  Education done throughout process for group home aide who will be assisting with this care.      Assessment:  -Wound care at right groin and posterior neck sites, s/p Excisional debridement of the posterior neck abscess and right groin abscesses/hidradenitis sites  -Preliminary culture results: Staph and diptheroids    Plan:  Reviewed and educated on wound care regimen. Some additional supplies provided to pt for home use: 1/2\" NuGauze packing tape, adhesive remover, skin protectant. Continue Norco 1 hour prior to wound care, use acetaminophen, ibuprofen, or ice packs for pain control remainder of the day if at all possible (minimize narcotic).    Patient would like to come to our office " daily for wound care. I think 1-2 times per weeks is reasonable since his home aid has been doing wound care just fine and wound looks great.     Farooq is recommended to contact the office if worsening pain, redness, or drainage from the area or onset of fever/chills.  He is in agreement with this plan.      Alexandru Davis PA-C  3//4/19    Please route or send letter to:  Primary Care Provider (PCP)

## 2019-03-04 NOTE — TELEPHONE ENCOUNTER
"RN to please contact Brown (caregiver) and offer appointments in surgery office for additional education and walking through the wound care.  Doubt this pt would qualify for home care as I don't think he is \"home bound\".  Valarie Chilel PA-C    "

## 2019-03-04 NOTE — TELEPHONE ENCOUNTER
Name of caller: caregiver-Brown, manager at home facility    Reason for Call:  Would like to get a home care nurse for wound care    Surgeon:  Dr. Shane    Recent Surgery:  Yes.    If yes, when & what type:  2/28/19    Excisional debridement of posterior neck and right groin abscesses               Best phone number to reach pt at is: [phone]  Ok to leave a message with medical info? Yes.    Pharmacy preferred (if calling for a refill): na

## 2019-03-05 ENCOUNTER — TELEPHONE (OUTPATIENT)
Dept: SURGERY | Facility: CLINIC | Age: 24
End: 2019-03-05

## 2019-03-05 DIAGNOSIS — T14.8XXA OPEN WOUND: Primary | ICD-10-CM

## 2019-03-05 LAB
BACTERIA SPEC CULT: ABNORMAL
BACTERIA SPEC CULT: ABNORMAL
Lab: ABNORMAL
SPECIMEN SOURCE: ABNORMAL

## 2019-03-05 NOTE — TELEPHONE ENCOUNTER
S/P excisional debridement of the posterior neck abscess and right groin abscesses/hidradenitis sites by Dr. Shane on February/28.     Cami ([phone]) from [facility] coordination is calling.    Patients group home staff unable to complete complex wound care and dressing changes at home.  Patient needs orders for home care wound care.    Discussed with clinic CHARU . He will place orders.  Lakshmi Martinez RN on 3/5/2019 at 3:12 PM

## 2019-03-06 ENCOUNTER — OFFICE VISIT (OUTPATIENT)
Dept: SURGERY | Facility: CLINIC | Age: 24
End: 2019-03-06
Payer: COMMERCIAL

## 2019-03-06 ENCOUNTER — TELEPHONE (OUTPATIENT)
Dept: SURGERY | Facility: CLINIC | Age: 24
End: 2019-03-06

## 2019-03-06 VITALS
RESPIRATION RATE: 16 BRPM | HEIGHT: 71 IN | WEIGHT: 283 LBS | HEART RATE: 88 BPM | BODY MASS INDEX: 39.62 KG/M2 | DIASTOLIC BLOOD PRESSURE: 82 MMHG | SYSTOLIC BLOOD PRESSURE: 116 MMHG

## 2019-03-06 DIAGNOSIS — Z09 SURGICAL FOLLOWUP VISIT: Primary | ICD-10-CM

## 2019-03-06 PROCEDURE — 99024 POSTOP FOLLOW-UP VISIT: CPT | Performed by: PHYSICIAN ASSISTANT

## 2019-03-06 ASSESSMENT — MIFFLIN-ST. JEOR: SCORE: 2295.81

## 2019-03-06 NOTE — LETTER
"2019       Re: Farooq Sorto - 1995    Farooq Sorto is here for routine wound care. He underwent excisional debridement of the posterior neck abscess and right groin abscesses/hidradenitis sites by Dr. Shane on 19. Today he tells me that he has some itching and burning sensation at the wound site. He does take an oxycodone occasionally for pain, usually before wound care. They have been changing the packing daily and putting overlay gauze dressings on prn.     Objective:  Blood pressure 116/82, pulse 88, resp. rate 16, height 1.803 m (5' 11\"), weight 128.4 kg (283 lb).      Wound/tissue appearance/intervention:   Right groin: 3 sites, minimal induration, no erythema, very mild drainage on dressing.  Cleansed with wound spray and redressed all 3 sites with one gauze/tape overlay dressing (no packing).    Posterior neck: +minimal induration around wound, no erythema moderate drainage on packing, +mild tenderness. Wound spray used to moisten packing and remove. Site cleansed with wound spray, undermining noted at the left superior aspect of the wound bed.  Mesalt packing tape placed in wound with overlay dry gauze/tape dressing.      Assessment:  -Wound care at right groin and posterior neck sites, s/p Excisional debridement of the posterior neck abscess and right groin abscesses/hidradenitis sites  -Preliminary culture results: Staph and diptheroids     Plan:  Once again I reviewed and educated on wound care regimen to him and his aid Brown.   Wound care regimen: Switching to Mesalt packing and can be changed three times per week by home health nurse. Dry overlay gauze will need to be changed prn (1-3 times per day). He should be seen in our clinic every two weeks.  Mesalt given to patient (two rolls).      Farooq is recommended to contact the office if worsening pain, redness, or drainage from the area or onset of fever/chills.  He is in agreement with this plan.        Alexandru Davis, " CHARU

## 2019-03-06 NOTE — PROGRESS NOTES
"[facility] Note   Subjective:  Farooq Sorto is here for routine wound care. He underwent excisional debridement of the posterior neck abscess and right groin abscesses/hidradenitis sites by Dr. Shane on 2/28/19. Today he tells me that he has some itching and burning sensation at the wound site. He does take an oxycodone occasionally for pain, usually before wound care. They have been changing the packing daily and putting overlay gauze dressings on prn.    Objective:  Blood pressure 116/82, pulse 88, resp. rate 16, height 1.803 m (5' 11\"), weight 128.4 kg (283 lb).     Wound/tissue appearance/intervention:   Right groin: 3 sites, minimal induration, no erythema, very mild drainage on dressing.  Cleansed with wound spray and redressed all 3 sites with one gauze/tape overlay dressing (no packing).    Posterior neck: +minimal induration around wound, no erythema moderate drainage on packing, +mild tenderness. Wound spray used to moisten packing and remove. Site cleansed with wound spray, undermining noted at the left superior aspect of the wound bed.  Mesalt packing tape placed in wound with overlay dry gauze/tape dressing.     Assessment:  -Wound care at right groin and posterior neck sites, s/p Excisional debridement of the posterior neck abscess and right groin abscesses/hidradenitis sites  -Preliminary culture results: Staph and diptheroids    Plan:  Once again I reviewed and educated on wound care regimen to him and his aid Brown.   Wound care regimen: Switching to Mesalt packing and can be changed three times per week by home health nurse. Dry overlay gauze will need to be changed prn (1-3 times per day). He should be seen in our clinic every two weeks.  Mesalt given to patient (two rolls).     Farooq is recommended to contact the office if worsening pain, redness, or drainage from the area or onset of fever/chills.  He is in agreement with this plan.      Alexandru Davis PA-C  3/6/19    Please " route or send letter to:  None

## 2019-03-06 NOTE — TELEPHONE ENCOUNTER
S/p Excisional debridement of posterior neck abscess;   Excisional debridement of right groin hidradenitis x3. 2/28/19.    Surgeon:  Dr. Adore June from [facility] calling re: clarification of wound care orders.  They are able to have RN see patient for wound cares 3x's /week, not every day.    Patient has appointment for wound care in our clinic this afternoon.    Discussed with Valarie Chilel PA-C.  Plan is to change wound packing to mesalt and have dressing/packing changed 3x's/ week on M,W,F by Homecare RN. Patient to be seen in our clinic every 2 weeks.    Valarie will document new plan for wound care in her OV note today and send enough mesalt packing home with patient for the next two dressing changes as Homecare is not able to get mesalt supplies until Tuesday 3/12/19.  Shaylee ([facility]) is in agreement with this plan.

## 2019-03-07 LAB
BACTERIA SPEC CULT: ABNORMAL
Lab: ABNORMAL
SPECIMEN SOURCE: ABNORMAL

## 2019-03-19 ENCOUNTER — TRANSFERRED RECORDS (OUTPATIENT)
Dept: HEALTH INFORMATION MANAGEMENT | Facility: CLINIC | Age: 24
End: 2019-03-19

## 2019-03-21 ENCOUNTER — OFFICE VISIT (OUTPATIENT)
Dept: SURGERY | Facility: CLINIC | Age: 24
End: 2019-03-21
Payer: COMMERCIAL

## 2019-03-21 VITALS
BODY MASS INDEX: 39.62 KG/M2 | RESPIRATION RATE: 16 BRPM | OXYGEN SATURATION: 96 % | WEIGHT: 283 LBS | SYSTOLIC BLOOD PRESSURE: 126 MMHG | HEART RATE: 92 BPM | HEIGHT: 71 IN | DIASTOLIC BLOOD PRESSURE: 88 MMHG

## 2019-03-21 DIAGNOSIS — L92.9 EXCESSIVE GRANULATION: ICD-10-CM

## 2019-03-21 DIAGNOSIS — T14.8XXA OPEN WOUND: ICD-10-CM

## 2019-03-21 DIAGNOSIS — Z09 SURGICAL FOLLOWUP VISIT: Primary | ICD-10-CM

## 2019-03-21 PROCEDURE — 99024 POSTOP FOLLOW-UP VISIT: CPT | Performed by: PHYSICIAN ASSISTANT

## 2019-03-21 ASSESSMENT — MIFFLIN-ST. JEOR: SCORE: 2295.81

## 2019-03-21 NOTE — PROGRESS NOTES
"[facility] Note   Subjective:  Farooq Sorto is here for routine wound care. He underwent excisional debridement of the posterior neck abscess and right groin abscesses/hidradenitis sites by Dr. Shane on 2/28/19.     Right groin sites fully healed.  Pain management: none needed   Neck wound care: 3 times a week packing change by home nurse, prn change of overlay dressing, replacement of packing as needed if it falls out.  It seems to fall out very easily now as the wound is smaller.    Objective:  /88   Pulse 92   Resp 16   Ht 1.803 m (5' 11\")   Wt 128.4 kg (283 lb)   SpO2 96%   BMI 39.47 kg/m       Wound/tissue appearance/intervention:     Posterior neck: no erythema/induration, wound quite small. Minimal drainage on packing.  Site cleansed with wound spray/swab.  Wound base granulating well.  Overgrowth of granulation tissue noted at right apex of the wound; treated with silver nitrate.  New overlay dry gauze/bacitracin/tape dressing.   Measurements: depth=6mm, length 1.4cm    Assessment:  -Wound care at right groin and posterior neck wound, s/p Excisional debridement of the posterior neck abscess and right groin abscesses/hidradenitis sites    Plan:  OK to change to daily change of overlay dressing only; non-stick ointment as needed.     Farooq is recommended to [facility] for appt in 2 weeks.  He is in agreement with this plan.    Valarie Chilel PA-C    Please route or send letter to:  None  "

## 2019-03-21 NOTE — LETTER
"2019    RE: Farooq Sorto, : 1995      [facility] Note   Subjective:  Farooq Sorto is here for routine wound care. He underwent excisional debridement of the posterior neck abscess and right groin abscesses/hidradenitis sites by Dr. Shane on 19.      Right groin sites fully healed.  Pain management: none needed         Neck wound care: 3 times a week packing change by home nurse, prn change of overlay dressing, replacement of packing as needed if it falls out.  It seems to fall out very easily now as the wound is smaller.     Objective:  /88   Pulse 92   Resp 16   Ht 1.803 m (5' 11\")   Wt 128.4 kg (283 lb)   SpO2 96%   BMI 39.47 kg/m        Wound/tissue appearance/intervention:     Posterior neck: no erythema/induration, wound quite small. Minimal drainage on packing.  Site cleansed with wound spray/swab.  Wound base granulating well.  Overgrowth of granulation tissue noted at right apex of the wound; treated with silver nitrate.  New overlay dry gauze/bacitracin/tape dressing.   Measurements: depth=6mm, length 1.4cm     Assessment:  -Wound care at right groin and posterior neck wound, s/p Excisional debridement of the posterior neck abscess and right groin abscesses/hidradenitis sites     Plan:  OK to change to daily change of overlay dressing only; non-stick ointment as needed.      Farooq is recommended to [facility] for appt in 2 weeks.  He is in agreement with this plan.     Valarie Chilel PA-C         "

## 2019-04-01 ENCOUNTER — MEDICAL CORRESPONDENCE (OUTPATIENT)
Dept: HEALTH INFORMATION MANAGEMENT | Facility: CLINIC | Age: 24
End: 2019-04-01

## 2019-04-01 DIAGNOSIS — K21.9 GASTROESOPHAGEAL REFLUX DISEASE WITHOUT ESOPHAGITIS: ICD-10-CM

## 2019-04-02 NOTE — TELEPHONE ENCOUNTER
"Requested Prescriptions   Pending Prescriptions Disp Refills     ranitidine (ZANTAC) 150 MG tablet [Pharmacy Med Name: RANITIDINE 150MG TABLETS]  Last Written Prescription Date:  12/12/18  Last Fill Quantity: 180,  # refills: 2   Last office visit: 2/27/2019 with prescribing provider:  Kamran   Future Office Visit:     180 tablet 0     Sig: TAKE 1 TABLET(150 MG) BY MOUTH TWICE DAILY    H2 Blockers Protocol Passed - 4/1/2019 11:49 AM       Passed - Patient is age 12 or older       Passed - Recent (12 mo) or future (30 days) visit within the authorizing provider's specialty    Patient had office visit in the last 12 months or has a visit in the next 30 days with authorizing provider or within the authorizing provider's specialty.  See \"Patient Info\" tab in inbasket, or \"Choose Columns\" in Meds & Orders section of the refill encounter.             Passed - Medication is active on med list          "

## 2019-04-04 ENCOUNTER — OFFICE VISIT (OUTPATIENT)
Dept: SURGERY | Facility: CLINIC | Age: 24
End: 2019-04-04
Payer: COMMERCIAL

## 2019-04-04 VITALS
WEIGHT: 283 LBS | RESPIRATION RATE: 16 BRPM | HEART RATE: 88 BPM | HEIGHT: 71 IN | BODY MASS INDEX: 39.62 KG/M2 | SYSTOLIC BLOOD PRESSURE: 114 MMHG | OXYGEN SATURATION: 97 % | DIASTOLIC BLOOD PRESSURE: 76 MMHG

## 2019-04-04 DIAGNOSIS — Z09 SURGICAL FOLLOWUP VISIT: Primary | ICD-10-CM

## 2019-04-04 PROCEDURE — 99024 POSTOP FOLLOW-UP VISIT: CPT | Performed by: PHYSICIAN ASSISTANT

## 2019-04-04 ASSESSMENT — MIFFLIN-ST. JEOR: SCORE: 2295.81

## 2019-04-04 NOTE — PROGRESS NOTES
"[facility]   Subjective:  Farooq Sorto is here for routine wound care. He underwent excisional debridement of the posterior neck abscess and right groin abscesses/hidradenitis sites by Dr. Shane on 2/28/19.     Right groin sites fully healed.  Pain management: none needed   Neck wound care: hasn't needed to cover as site seems healed.    Objective:  /76   Pulse 88   Resp 16   Ht 1.803 m (5' 11\")   Wt 128.4 kg (283 lb)   SpO2 97%   BMI 39.47 kg/m       Wound/tissue appearance/intervention:     Posterior neck: wound healed.  Fully epithelialized.     Assessment:  -Wound care at right groin and posterior neck wound, s/p Excisional debridement of the posterior neck abscess and right groin abscesses/hidradenitis sites    Plan:  No further cares needed.  RTC prn.    Vlaarie Chilel PA-C    Please route or send letter to:  PCP  "

## 2019-04-10 ENCOUNTER — TELEPHONE (OUTPATIENT)
Dept: INTERNAL MEDICINE | Facility: CLINIC | Age: 24
End: 2019-04-10

## 2019-04-10 DIAGNOSIS — Z53.9 DIAGNOSIS NOT YET DEFINED: Primary | ICD-10-CM

## 2019-04-10 PROCEDURE — G0180 MD CERTIFICATION HHA PATIENT: HCPCS | Performed by: INTERNAL MEDICINE

## 2019-04-18 ENCOUNTER — OFFICE VISIT (OUTPATIENT)
Dept: INTERNAL MEDICINE | Facility: CLINIC | Age: 24
End: 2019-04-18
Payer: COMMERCIAL

## 2019-04-18 VITALS
HEIGHT: 71 IN | BODY MASS INDEX: 39.9 KG/M2 | RESPIRATION RATE: 20 BRPM | HEART RATE: 86 BPM | TEMPERATURE: 97.8 F | WEIGHT: 285 LBS | DIASTOLIC BLOOD PRESSURE: 86 MMHG | SYSTOLIC BLOOD PRESSURE: 124 MMHG | OXYGEN SATURATION: 95 %

## 2019-04-18 DIAGNOSIS — L02.211 ABSCESS OF SKIN OF ABDOMEN: Primary | ICD-10-CM

## 2019-04-18 PROCEDURE — 99213 OFFICE O/P EST LOW 20 MIN: CPT | Performed by: FAMILY MEDICINE

## 2019-04-18 RX ORDER — CLINDAMYCIN HCL 300 MG
300 CAPSULE ORAL 3 TIMES DAILY
Qty: 30 CAPSULE | Refills: 0 | Status: SHIPPED | OUTPATIENT
Start: 2019-04-18 | End: 2019-04-28

## 2019-04-18 ASSESSMENT — MIFFLIN-ST. JEOR: SCORE: 2299.88

## 2019-04-18 NOTE — PATIENT INSTRUCTIONS
Take prescribed medication as directed.    Wash area with soap and water - preferably in the shower - twice daily. The bandage.

## 2019-04-18 NOTE — PROGRESS NOTES
"  CHIEF COMPLAINT    Check abscess lower R abdomen      HISTORY    He had a lump which spontaneously drained about a week ago. Location as noted. He has had some infected areas in this region before.      Patient Active Problem List   Diagnosis     Aggressive behavior     Substance abuse (H)     Suicidal ideation     Mild major depression (H)     Generalized anxiety disorder     Psychosis (H)     Non morbid obesity due to excess calories     Morbid obesity (H)     Seizure disorder (H)       REVIEW OF SYSTEMS    No fever  Unremarkable except as above.      Lives in group home.      Past Medical History:   Diagnosis Date     ADHD (attention deficit hyperactivity disorder)      Anxiety      Chemical dependency (H)      Depressive disorder      Down syndrome      Seizures (H)        EXAM  /86   Pulse 86   Temp 97.8  F (36.6  C) (Oral)   Resp 20   Ht 1.803 m (5' 11\")   Wt 129.3 kg (285 lb)   SpO2 95%   BMI 39.75 kg/m      Large young man.  Afeb. NAD.    Skin:  2 cm open, granulating area R lower abd with scant serous drainage.  No surrounding redness.  Not particularly tender.      (L02.211) Abscess of skin of abdomen  (primary encounter diagnosis)  Comment:   Plan: clindamycin (CLEOCIN) 300 MG capsule          Patient advised to return for worsening or persistent symptoms.        "

## 2019-04-24 DIAGNOSIS — K21.9 GASTROESOPHAGEAL REFLUX DISEASE WITHOUT ESOPHAGITIS: ICD-10-CM

## 2019-04-24 NOTE — TELEPHONE ENCOUNTER
"Requested Prescriptions   Pending Prescriptions Disp Refills     ranitidine (ZANTAC) 150 MG tablet [Pharmacy Med Name: RANITIDINE 150MG TABLETS] 180 tablet 0     Sig: TAKE 1 TABLET(150 MG) BY MOUTH TWICE DAILY   Last Written Prescription Date:  12/12/2018  Last Fill Quantity: 180,  # refills: 2   Last office visit: 4/18/2019 with prescribing provider:     Future Office Visit:      H2 Blockers Protocol Passed - 4/24/2019  3:33 PM        Passed - Patient is age 12 or older        Passed - Recent (12 mo) or future (30 days) visit within the authorizing provider's specialty     Patient had office visit in the last 12 months or has a visit in the next 30 days with authorizing provider or within the authorizing provider's specialty.  See \"Patient Info\" tab in inbasket, or \"Choose Columns\" in Meds & Orders section of the refill encounter.              Passed - Medication is active on med list        "

## 2019-10-02 ENCOUNTER — HEALTH MAINTENANCE LETTER (OUTPATIENT)
Age: 24
End: 2019-10-02

## 2019-10-31 ENCOUNTER — HEALTH MAINTENANCE LETTER (OUTPATIENT)
Age: 24
End: 2019-10-31

## 2020-03-22 ENCOUNTER — HEALTH MAINTENANCE LETTER (OUTPATIENT)
Age: 25
End: 2020-03-22

## 2021-01-15 ENCOUNTER — HEALTH MAINTENANCE LETTER (OUTPATIENT)
Age: 26
End: 2021-01-15

## 2021-05-15 ENCOUNTER — HEALTH MAINTENANCE LETTER (OUTPATIENT)
Age: 26
End: 2021-05-15

## 2021-09-04 ENCOUNTER — HEALTH MAINTENANCE LETTER (OUTPATIENT)
Age: 26
End: 2021-09-04

## 2022-02-19 ENCOUNTER — HEALTH MAINTENANCE LETTER (OUTPATIENT)
Age: 27
End: 2022-02-19

## 2022-10-22 ENCOUNTER — HEALTH MAINTENANCE LETTER (OUTPATIENT)
Age: 27
End: 2022-10-22

## 2023-03-02 NOTE — TELEPHONE ENCOUNTER
home health certification received via fax.   Otolaryngologist Procedure Text (A): After obtaining clear surgical margins the patient was sent to otolaryngology for surgical repair.  The patient understands they will receive post-surgical care and follow-up from the referring physician's office.

## 2023-04-01 ENCOUNTER — HEALTH MAINTENANCE LETTER (OUTPATIENT)
Age: 28
End: 2023-04-01

## (undated) DEVICE — PACK ENT PLASTICS RIDGES

## (undated) DEVICE — PREP SKIN SCRUB TRAY 4461A

## (undated) DEVICE — ESU PENCIL W/HOLSTER E2350H

## (undated) DEVICE — TUBING SMOKE EVAC ATTACHMENT E3590

## (undated) DEVICE — GLOVE PROTEXIS BLUE W/NEU-THERA 7.0  2D73EB70

## (undated) DEVICE — LINEN FULL SHEET 5511

## (undated) DEVICE — PREP POVIDONE IODINE SOLUTION 10% 120ML

## (undated) DEVICE — ESU PENCIL W/COATED BLADE E2450H

## (undated) DEVICE — PACKING IODOFORM STRIP 1" 7833

## (undated) DEVICE — TUBING SMOKE EVAC 3/8"X10' E3645

## (undated) DEVICE — GLOVE PROTEXIS POWDER FREE SMT 6.5  2D72PT65X

## (undated) DEVICE — SUCTION CANISTER MEDIVAC LINER 3000ML W/LID 65651-530

## (undated) DEVICE — DECANTER VIAL 2006S

## (undated) DEVICE — PREP POVIDONE IODINE SCRUB 7.5% 120ML

## (undated) DEVICE — TUBE CULTURE AEROBIC/ANAEROBIC W/O SWABS A.C.T.I.1. 12401

## (undated) DEVICE — BLADE KNIFE SURG 15 371115

## (undated) DEVICE — LINEN HALF SHEET 5512

## (undated) DEVICE — LINEN TOWEL PACK X10 5473

## (undated) DEVICE — SYR 10ML FINGER CONTROL W/O NDL 309695

## (undated) DEVICE — ESU GROUND PAD ADULT W/CORD E7507

## (undated) DEVICE — BAG CLEAR TRASH 1.3M 39X33" P4040C

## (undated) DEVICE — PREFILTER SMOKE EVAC E6330

## (undated) DEVICE — SU CHROMIC 3-0 SH 27" G122H

## (undated) DEVICE — SUCTION TIP YANKAUER STR K87

## (undated) DEVICE — SYR EAR BULB 3OZ 0035830

## (undated) DEVICE — KIT CULTURE ESWAB AEROBE/ANAEROBE WHITE TOP R723480

## (undated) RX ORDER — ALBUTEROL SULFATE 0.83 MG/ML
SOLUTION RESPIRATORY (INHALATION)
Status: DISPENSED
Start: 2019-02-28

## (undated) RX ORDER — BUPIVACAINE HYDROCHLORIDE 2.5 MG/ML
INJECTION, SOLUTION EPIDURAL; INFILTRATION; INTRACAUDAL
Status: DISPENSED
Start: 2019-02-28

## (undated) RX ORDER — FENTANYL CITRATE 50 UG/ML
INJECTION, SOLUTION INTRAMUSCULAR; INTRAVENOUS
Status: DISPENSED
Start: 2019-02-28

## (undated) RX ORDER — DEXAMETHASONE SODIUM PHOSPHATE 4 MG/ML
INJECTION, SOLUTION INTRA-ARTICULAR; INTRALESIONAL; INTRAMUSCULAR; INTRAVENOUS; SOFT TISSUE
Status: DISPENSED
Start: 2019-02-28

## (undated) RX ORDER — GLYCOPYRROLATE 0.2 MG/ML
INJECTION INTRAMUSCULAR; INTRAVENOUS
Status: DISPENSED
Start: 2019-02-28

## (undated) RX ORDER — LIDOCAINE HYDROCHLORIDE 10 MG/ML
INJECTION, SOLUTION EPIDURAL; INFILTRATION; INTRACAUDAL; PERINEURAL
Status: DISPENSED
Start: 2019-02-28

## (undated) RX ORDER — ONDANSETRON 2 MG/ML
INJECTION INTRAMUSCULAR; INTRAVENOUS
Status: DISPENSED
Start: 2019-02-28

## (undated) RX ORDER — CEFAZOLIN SODIUM IN 0.9 % NACL 3 G/100 ML
INTRAVENOUS SOLUTION, PIGGYBACK (ML) INTRAVENOUS
Status: DISPENSED
Start: 2019-02-28

## (undated) RX ORDER — PROPOFOL 10 MG/ML
INJECTION, EMULSION INTRAVENOUS
Status: DISPENSED
Start: 2019-02-28